# Patient Record
Sex: FEMALE | Employment: OTHER | URBAN - METROPOLITAN AREA
[De-identification: names, ages, dates, MRNs, and addresses within clinical notes are randomized per-mention and may not be internally consistent; named-entity substitution may affect disease eponyms.]

---

## 2018-01-03 ENCOUNTER — MEDICAL CORRESPONDENCE (OUTPATIENT)
Dept: HEALTH INFORMATION MANAGEMENT | Facility: CLINIC | Age: 58
End: 2018-01-03

## 2018-01-22 ENCOUNTER — RADIANT APPOINTMENT (OUTPATIENT)
Dept: MAMMOGRAPHY | Facility: CLINIC | Age: 58
End: 2018-01-22
Attending: INTERNAL MEDICINE
Payer: COMMERCIAL

## 2018-01-22 DIAGNOSIS — Z12.31 VISIT FOR SCREENING MAMMOGRAM: ICD-10-CM

## 2019-01-25 ENCOUNTER — MEDICAL CORRESPONDENCE (OUTPATIENT)
Dept: HEALTH INFORMATION MANAGEMENT | Facility: CLINIC | Age: 59
End: 2019-01-25

## 2020-01-30 ENCOUNTER — TRANSCRIBE ORDERS (OUTPATIENT)
Dept: OTHER | Age: 60
End: 2020-01-30

## 2020-01-30 DIAGNOSIS — R07.0 THROAT PAIN: Primary | ICD-10-CM

## 2020-01-30 NOTE — TELEPHONE ENCOUNTER
FUTURE VISIT INFORMATION      FUTURE VISIT INFORMATION:    Date: 2/20/20    Time: 1:40 PM    Location: [facility]  REFERRAL INFORMATION:    Referring provider:  Dr. Elli Acuña    Referring providers clinic:  [facility]    Reason for visit/diagnosis: Throat Pain    RECORDS REQUESTED FROM:       Clinic name Comments Records Status Imaging Status   [facility] 1/29/20 - OV and Referral from Dr. Acuña Care Everywhere    [facility] -  OV with Dennys Zhu [facility]BS  7/20/16 -  OV with Dr. Khalil  3/26/16 -  OV with Dr. Pineda Care Everywhere                              * 1/30/20 11:38 AM Faxed req to [facility] for OV records - [place]er[place]  * 2/3/20 8:58 AM Received fax from [facility] stating provider has not signed encounter yet so it is not available to be sent, will request again closer to appt date - Guerita

## 2020-01-31 ENCOUNTER — DOCUMENTATION ONLY (OUTPATIENT)
Dept: CARE COORDINATION | Facility: CLINIC | Age: 60
End: 2020-01-31

## 2020-02-06 ENCOUNTER — ANCILLARY PROCEDURE (OUTPATIENT)
Dept: MAMMOGRAPHY | Facility: CLINIC | Age: 60
End: 2020-02-06
Attending: INTERNAL MEDICINE
Payer: COMMERCIAL

## 2020-02-06 ENCOUNTER — ANCILLARY PROCEDURE (OUTPATIENT)
Dept: ULTRASOUND IMAGING | Facility: CLINIC | Age: 60
End: 2020-02-06
Attending: INTERNAL MEDICINE
Payer: COMMERCIAL

## 2020-02-06 DIAGNOSIS — Z12.31 VISIT FOR SCREENING MAMMOGRAM: ICD-10-CM

## 2020-02-06 DIAGNOSIS — B18.1 HEPATITIS B, CHRONIC (H): ICD-10-CM

## 2020-02-20 ENCOUNTER — OFFICE VISIT (OUTPATIENT)
Dept: OTOLARYNGOLOGY | Facility: CLINIC | Age: 60
End: 2020-02-20
Attending: INTERNAL MEDICINE
Payer: COMMERCIAL

## 2020-02-20 ENCOUNTER — PRE VISIT (OUTPATIENT)
Dept: OTOLARYNGOLOGY | Facility: CLINIC | Age: 60
End: 2020-02-20

## 2020-02-20 VITALS
WEIGHT: 110 LBS | BODY MASS INDEX: 22.18 KG/M2 | HEART RATE: 76 BPM | DIASTOLIC BLOOD PRESSURE: 67 MMHG | SYSTOLIC BLOOD PRESSURE: 107 MMHG | HEIGHT: 59 IN

## 2020-02-20 DIAGNOSIS — R09.A2 GLOBUS PHARYNGEUS: Primary | ICD-10-CM

## 2020-02-20 DIAGNOSIS — G51.4 FACIAL TWITCHING: ICD-10-CM

## 2020-02-20 RX ORDER — ARIPIPRAZOLE 5 MG/1
5 TABLET ORAL DAILY
COMMUNITY
Start: 2020-02-20

## 2020-02-20 ASSESSMENT — MIFFLIN-ST. JEOR: SCORE: 976.71

## 2020-02-20 NOTE — LETTER
2/20/2020       RE: Hon Kurtz  [address]  [facility] [address]     Dear Colleague,    Thank you for referring your patient, Hon Kurtz, to the [facility] EAR NOSE AND THROAT at [facility]. Please see a copy of my visit note below.    [facility] Ear, Nose and Throat Clinic New Patient Visit Note        Otolaryngology        February 20, 2020    Referring Provider:  Elli Acuña MD         HPI:  Hon Kurtz is a 60 year old female who presents for evaluation of 10 years of excessive phlegm in her throat.    With the help of , I interviewed .   reports that for greater than 10 years she has been getting phlegm in her throat and coughs it up.  She states she gets a sensation that something is stuck in her throat.  She points to the anterior portion of the neck, just above the thyroid gland when she reports this finding.  She states that when she gets that feeling, she tries to cough up phlegm.  This usually white in color.  It is fairly thin most of the time.  It seems to happen more often in the morning but it does occur throughout the day.  She notices it more with any kind of activity.  That activity can be just walking around the house and doing housecleaning, etc.  There is no pain in her throat associated with this.  No food gets stuck when she eats or drink.  She is not choking or coughing with eating or drinking.    She has not had any issues with fevers or chills, headaches, sore throat, nasal congestion, rhinorrhea, ear pain or drainage, hearing changes, tinnitus.  There is no associated change in taste or smell.  No masses in her neck.  No hemoptysis.  She just wants to know why she is been doing this.    ROS:  10 point review of systems completed and is negative except for those listed above    PMH: History of bipolar disorder, [place] Hunt syndrome with left-sided facial paresis since 2011.    PSH:   Past Surgical History:   Procedure Laterality  "Date     TUBAL LIGATION     Bilateral cataract surgery    FamH: No family history on file.    SocH:   Social History     Tobacco Use     Smoking status: Never Smoker     Smokeless tobacco: Never Used   Substance Use Topics     Alcohol use: None     Drug use: None       Medications:   Current Outpatient Medications   Medication     ARIPiprazole 5 MG PO tablet     IBUPROFEN PO     No current facility-administered medications for this visit.        Allergies:   No Known Allergies      Physical Exam:   /67   Pulse 76   Ht 1.51 m (4' 11.45\")   Wt 49.9 kg (110 lb)   BMI 21.88 kg/m       Constitutional: The patient was accompanied by , well-groomed, and in no acute distress.    Head: Normocephalic and atraumatic. Drooping of the left side of the face, involving the forehead, eye and mouth   Eyes: Pupils were equal and reactive.  Extraocular movement intact.  Drooping of the left eyelid   Ears: Pinnae and tragus non-tender.  Otologic microscopic ear exam:  Right: EAC clear, TM clear.  No evidence of retraction, effusion, perforation or cholesteatoma.  Left: EAC clear, TM clear.  No evidence of retraction, effusion, perforation or cholesteatoma.  Nose: Sinuses were non-tender.  Anterior rhinoscopy revealed midline septum and absence of purulence or polyps.    Mouth: Mucosa pink and moist, tonsils non-erythematous, no exudates, uvula midline, poor dentition, normal buccal mucosa, gingiva, tongue and palate, no masses or lesions  Neck: No lymphadenopathy in the neck.  No palpable thyroid.  Normal range of motion, no tenderness of the anterior neck  Respiratory: Breathing comfortably without stridor or exertion of accessory muscles. Patient makes and \"inspiratory stridor\" sound randomly during visit, but every time throat is palpated or stimulated, she makes this sound as well  Skin: Normal:  warm and pink without rash  Neurologic: Alert and oriented x 3. CN's III-XII within normal limits except for CNVII " with left sided facial dropping. Involuntary twitching of the left side of the face. Voice normal.   Psychiatric: The patient's affect was calm, cooperative, and appropriate.        Fiberoptic Endoscopy:  Consent for fiberoptic laryngoscopy was obtained, and we confirmed correctness of procedure and identity of patient.  Fiberoptic laryngoscopy was indicated due to globus.  The nose was topically decongested and anesthetized.  The fiberoptic laryngoscope was passed under endoscopic vision.  The turbinates were normal.  The inferior and middle meati were clear bilaterally without purulence, masses, or polyps.  The nasopharynx was clear.  The Eustachian tubes were clear.  The soft palate appeared normal with good mobility.  The epiglottis was sharp and the visualized portion of the vallecula was clear.  The larynx was clear with mobile cords.  The arytenoids were clear and there was no pooling in the hypopharynx.   No concerning lesions or masses.    See Imagestream recording in the [facility] Orders section        Assessment/Plan:     1. Globus pharyngeus  Patient with symptoms of globus.  No obvious masses on the endoscopy.  No palpable masses in the neck clinically.  No evidence of significant LPR or other reflux noted on exam.  No asymmetry of the vocal cord movements at this point.    Symptoms may be habit forming and voice therapy may be helpful in regards to treating this.  My other concern is as to whether or not this is related to her Abilify, which she is on for her bipolar disorder.  Also during our visit, she made random high-pitched inspiratory stridor type sounds.  This occurred at many random times but anytime the neck was palpated or tongue was examined.  Patient reports she has been doing this for many, many years, closer to the 10+ years that she has had the globus symptoms and spitting up with mucus.  I am concerned that these are more habit based things and possibly even related to anxiety or her  bipolar disorder.    Another concern I have,'s given the fact that she also has some left-sided facial twitching, is could these be related to the use of Abilify.  I have asked her to follow-up with her primary care provider or her psychiatrist in regards to her Abilify and the facial twitching and I hope they address this as well.    Because of the underlying globus and the potential habit of spitting and inspiratory pattern, I think she may benefit from some voice therapy to correct this or referred her to our laryngology/[place] voice clinic team.      2. Facial twitching  Patient with left-sided facial twitching which I am concerned is potentially related to her Abilify.  Asked the patient to follow-up with her primary care provider or psychiatrist, whoever is in charge of that medication at this time.  She agreed to do so.        Anne Ruiz PA-C  Otolaryngology  Head & Neck Surgery  [phone]      CC:  Elli Acuña MD

## 2020-02-20 NOTE — PROGRESS NOTES
[facility] Ear, Nose [facility] Throat [facility] New Patient Visit Note        Otolaryngology        February 20, 2020    Referring Provider:  Elli Acuña MD         HPI:  Hon Kurtz is a 60 year old female who presents for evaluation of 10 years of excessive phlegm in her throat.    With the help of , I interviewed .   reports that for greater than 10 years she has been getting phlegm in her throat and coughs it up.  She states she gets a sensation that something is stuck in her throat.  She points to the anterior portion of the neck, just above the thyroid gland when she reports this finding.  She states that when she gets that feeling, she tries to cough up phlegm.  This usually white in color.  It is fairly thin most of the time.  It seems to happen more often in the morning but it does occur throughout the day.  She notices it more with any kind of activity.  That activity can be just walking around the house and doing housecleaning, etc.  There is no pain in her throat associated with this.  No food gets stuck when she eats or drink.  She is not choking or coughing with eating or drinking.    She has not had any issues with fevers or chills, headaches, sore throat, nasal congestion, rhinorrhea, ear pain or drainage, hearing changes, tinnitus.  There is no associated change in taste or smell.  No masses in her neck.  No hemoptysis.  She just wants to know why she is been doing this.    ROS:  10 point review of systems completed and is negative except for those listed above    PMH: History of bipolar disorder, [place] Hunt syndrome with left-sided facial paresis since 2011.    PSH:   Past Surgical History:   Procedure Laterality Date     TUBAL LIGATION     Bilateral cataract surgery    FamH: No family history on file.    SocH:   Social History     Tobacco Use     Smoking status: Never Smoker     Smokeless tobacco: Never Used   Substance Use Topics     Alcohol use: None     Drug use: None       Medications:  "  Current Outpatient Medications   Medication     ARIPiprazole 5 MG PO tablet     IBUPROFEN PO     No current facility-administered medications for this visit.        Allergies:   No Known Allergies      Physical Exam:   /67   Pulse 76   Ht 1.51 m (4' 11.45\")   Wt 49.9 kg (110 lb)   BMI 21.88 kg/m      Constitutional: The patient was accompanied by , well-groomed, and in no acute distress.    Head: Normocephalic and atraumatic. Drooping of the left side of the face, involving the forehead, eye and mouth   Eyes: Pupils were equal and reactive.  Extraocular movement intact.  Drooping of the left eyelid   Ears: Pinnae and tragus non-tender.  Otologic microscopic ear exam:  Right: EAC clear, TM clear.  No evidence of retraction, effusion, perforation or cholesteatoma.  Left: EAC clear, TM clear.  No evidence of retraction, effusion, perforation or cholesteatoma.  Nose: Sinuses were non-tender.  Anterior rhinoscopy revealed midline septum and absence of purulence or polyps.    Mouth: Mucosa pink and moist, tonsils non-erythematous, no exudates, uvula midline, poor dentition, normal buccal mucosa, gingiva, tongue and palate, no masses or lesions  Neck: No lymphadenopathy in the neck.  No palpable thyroid.  Normal range of motion, no tenderness of the anterior neck  Respiratory: Breathing comfortably without stridor or exertion of accessory muscles. Patient makes and \"inspiratory stridor\" sound randomly during visit, but every time throat is palpated or stimulated, she makes this sound as well  Skin: Normal:  warm and pink without rash  Neurologic: Alert and oriented x 3. CN's III-XII within normal limits except for CNVII with left sided facial dropping. Involuntary twitching of the left side of the face. Voice normal.   Psychiatric: The patient's affect was calm, cooperative, and appropriate.        Fiberoptic Endoscopy:  Consent for fiberoptic laryngoscopy was obtained, and we confirmed correctness " of procedure and identity of patient.  Fiberoptic laryngoscopy was indicated due to globus.  The nose was topically decongested and anesthetized.  The fiberoptic laryngoscope was passed under endoscopic vision.  The turbinates were normal.  The inferior and middle meati were clear bilaterally without purulence, masses, or polyps.  The nasopharynx was clear.  The Eustachian tubes were clear.  The soft palate appeared normal with good mobility.  The epiglottis was sharp and the visualized portion of the vallecula was clear.  The larynx was clear with mobile cords.  The arytenoids were clear and there was no pooling in the hypopharynx.   No concerning lesions or masses.    See Imagestream recording in the [facility] Orders section        Assessment/Plan:     1. Globus pharyngeus  Patient with symptoms of globus.  No obvious masses on the endoscopy.  No palpable masses in the neck clinically.  No evidence of significant LPR or other reflux noted on exam.  No asymmetry of the vocal cord movements at this point.    Symptoms may be habit forming and voice therapy may be helpful in regards to treating this.  My other concern is as to whether or not this is related to her Abilify, which she is on for her bipolar disorder.  Also during our visit, she made random high-pitched inspiratory stridor type sounds.  This occurred at many random times but anytime the neck was palpated or tongue was examined.  Patient reports she has been doing this for many, many years, closer to the 10+ years that she has had the globus symptoms and spitting up with mucus.  I am concerned that these are more habit based things and possibly even related to anxiety or her bipolar disorder.    Another concern I have,'s given the fact that she also has some left-sided facial twitching, is could these be related to the use of Abilify.  I have asked her to follow-up with her primary care provider or her psychiatrist in regards to her Abilify and the facial  twitching and I hope they address this as well.    Because of the underlying globus and the potential habit of spitting and inspiratory pattern, I think she may benefit from some voice therapy to correct this or referred her to our laryngology/[place] voice clinic team.      2. Facial twitching  Patient with left-sided facial twitching which I am concerned is potentially related to her Abilify.  Asked the patient to follow-up with her primary care provider or psychiatrist, whoever is in charge of that medication at this time.  She agreed to do so.        Anne Ruiz PA-C  Otolaryngology  Head & Neck Surgery  [phone]      CC:  Elli Acuña MD   [facility] Hospitalist

## 2020-02-20 NOTE — PATIENT INSTRUCTIONS
Hon Kurtz,    It was a pleasure to see you today.    1. You were seen in the ENT Clinic today by Anne Ruiz PA-C.  If you have any questions or concerns after your appointment, please call   - Option 1: ENT Clinic: [phone]      2. Please return to see one of our Laryngologists for the globus symptoms.    3. Please talk to your primary care provider about the left facial twitching and my concern that this is possibly from the Abilify      Thank you,  Anne Ruiz PA-C  Otolaryngology  Head & Neck Surgery  [phone]

## 2022-02-24 ENCOUNTER — LAB REQUISITION (OUTPATIENT)
Dept: LAB | Facility: CLINIC | Age: 62
End: 2022-02-24
Payer: COMMERCIAL

## 2022-02-24 DIAGNOSIS — B18.1 CHRONIC VIRAL HEPATITIS B WITHOUT DELTA-AGENT (H): ICD-10-CM

## 2022-02-24 DIAGNOSIS — Z01.419 ENCOUNTER FOR GYNECOLOGICAL EXAMINATION (GENERAL) (ROUTINE) WITHOUT ABNORMAL FINDINGS: ICD-10-CM

## 2022-02-24 LAB
ALBUMIN SERPL-MCNC: 4 G/DL (ref 3.4–5)
ALP SERPL-CCNC: 145 U/L (ref 40–150)
ALT SERPL W P-5'-P-CCNC: 24 U/L (ref 0–50)
ANION GAP SERPL CALCULATED.3IONS-SCNC: 6 MMOL/L (ref 3–14)
AST SERPL W P-5'-P-CCNC: 18 U/L (ref 0–45)
BILIRUB SERPL-MCNC: 0.4 MG/DL (ref 0.2–1.3)
BUN SERPL-MCNC: 11 MG/DL (ref 7–30)
CALCIUM SERPL-MCNC: 9 MG/DL (ref 8.5–10.1)
CHLORIDE BLD-SCNC: 109 MMOL/L (ref 94–109)
CHOLEST SERPL-MCNC: 209 MG/DL
CO2 SERPL-SCNC: 27 MMOL/L (ref 20–32)
CREAT SERPL-MCNC: 0.57 MG/DL (ref 0.52–1.04)
FASTING STATUS PATIENT QL REPORTED: YES
GFR SERPL CREATININE-BSD FRML MDRD: >90 ML/MIN/1.73M2
GLUCOSE BLD-MCNC: 85 MG/DL (ref 70–99)
HDLC SERPL-MCNC: 89 MG/DL
LDLC SERPL CALC-MCNC: 108 MG/DL
NONHDLC SERPL-MCNC: 120 MG/DL
POTASSIUM BLD-SCNC: 3.8 MMOL/L (ref 3.4–5.3)
PROT SERPL-MCNC: 7.7 G/DL (ref 6.8–8.8)
SODIUM SERPL-SCNC: 142 MMOL/L (ref 133–144)
TRIGL SERPL-MCNC: 61 MG/DL

## 2022-02-24 PROCEDURE — 80061 LIPID PANEL: CPT | Mod: ORL | Performed by: PEDIATRICS

## 2022-02-24 PROCEDURE — 87517 HEPATITIS B DNA QUANT: CPT | Mod: ORL | Performed by: PEDIATRICS

## 2022-02-24 PROCEDURE — 80053 COMPREHEN METABOLIC PANEL: CPT | Mod: ORL | Performed by: PEDIATRICS

## 2022-02-25 LAB
HBV DNA SERPL NAA+PROBE-ACNC: <20 IU/ML
HBV DNA SERPL NAA+PROBE-LOG IU: <1.3 {LOG_IU}/ML

## 2022-02-28 ENCOUNTER — LAB REQUISITION (OUTPATIENT)
Dept: LAB | Facility: CLINIC | Age: 62
End: 2022-02-28
Payer: COMMERCIAL

## 2022-02-28 DIAGNOSIS — Z01.419 ENCOUNTER FOR GYNECOLOGICAL EXAMINATION (GENERAL) (ROUTINE) WITHOUT ABNORMAL FINDINGS: ICD-10-CM

## 2022-02-28 LAB — HEMOCCULT STL QL IA: NEGATIVE

## 2022-02-28 PROCEDURE — 82274 ASSAY TEST FOR BLOOD FECAL: CPT | Mod: ORL | Performed by: PEDIATRICS

## 2023-02-27 ENCOUNTER — LAB REQUISITION (OUTPATIENT)
Dept: LAB | Facility: CLINIC | Age: 63
End: 2023-02-27
Payer: COMMERCIAL

## 2023-02-27 DIAGNOSIS — B18.1 CHRONIC VIRAL HEPATITIS B WITHOUT DELTA-AGENT (H): ICD-10-CM

## 2023-02-27 DIAGNOSIS — Z13.220 ENCOUNTER FOR SCREENING FOR LIPOID DISORDERS: ICD-10-CM

## 2023-02-27 LAB
ALBUMIN SERPL BCG-MCNC: 4.3 G/DL (ref 3.5–5.2)
ALP SERPL-CCNC: 121 U/L (ref 35–104)
ALT SERPL W P-5'-P-CCNC: 15 U/L (ref 10–35)
ANION GAP SERPL CALCULATED.3IONS-SCNC: 15 MMOL/L (ref 7–15)
AST SERPL W P-5'-P-CCNC: 23 U/L (ref 10–35)
BILIRUB SERPL-MCNC: 0.6 MG/DL
BUN SERPL-MCNC: 18.3 MG/DL (ref 8–23)
CALCIUM SERPL-MCNC: 9.3 MG/DL (ref 8.8–10.2)
CHLORIDE SERPL-SCNC: 106 MMOL/L (ref 98–107)
CHOLEST SERPL-MCNC: 214 MG/DL
CREAT SERPL-MCNC: 0.55 MG/DL (ref 0.51–0.95)
DEPRECATED HCO3 PLAS-SCNC: 21 MMOL/L (ref 22–29)
GFR SERPL CREATININE-BSD FRML MDRD: >90 ML/MIN/1.73M2
GLUCOSE SERPL-MCNC: 103 MG/DL (ref 70–99)
HDLC SERPL-MCNC: 92 MG/DL
LDLC SERPL CALC-MCNC: 113 MG/DL
NONHDLC SERPL-MCNC: 122 MG/DL
POTASSIUM SERPL-SCNC: 3.8 MMOL/L (ref 3.4–5.3)
PROT SERPL-MCNC: 7.7 G/DL (ref 6.4–8.3)
SODIUM SERPL-SCNC: 142 MMOL/L (ref 136–145)
TRIGL SERPL-MCNC: 46 MG/DL

## 2023-02-27 PROCEDURE — 80053 COMPREHEN METABOLIC PANEL: CPT | Mod: ORL | Performed by: INTERNAL MEDICINE

## 2023-02-27 PROCEDURE — 80061 LIPID PANEL: CPT | Mod: ORL | Performed by: INTERNAL MEDICINE

## 2023-02-27 PROCEDURE — 87517 HEPATITIS B DNA QUANT: CPT | Mod: ORL | Performed by: INTERNAL MEDICINE

## 2023-02-28 LAB — HBV DNA SERPL NAA+PROBE-ACNC: NOT DETECTED IU/ML

## 2023-03-13 ENCOUNTER — LAB REQUISITION (OUTPATIENT)
Dept: LAB | Facility: CLINIC | Age: 63
End: 2023-03-13
Payer: COMMERCIAL

## 2023-03-13 DIAGNOSIS — Z12.11 ENCOUNTER FOR SCREENING FOR MALIGNANT NEOPLASM OF COLON: ICD-10-CM

## 2023-03-13 LAB — HEMOCCULT STL QL IA: NEGATIVE

## 2023-03-13 PROCEDURE — 82274 ASSAY TEST FOR BLOOD FECAL: CPT | Mod: ORL | Performed by: INTERNAL MEDICINE

## 2023-03-27 ENCOUNTER — LAB REQUISITION (OUTPATIENT)
Dept: LAB | Facility: CLINIC | Age: 63
End: 2023-03-27
Payer: COMMERCIAL

## 2023-03-27 DIAGNOSIS — Z12.4 ENCOUNTER FOR SCREENING FOR MALIGNANT NEOPLASM OF CERVIX: ICD-10-CM

## 2023-03-27 PROCEDURE — 87624 HPV HI-RISK TYP POOLED RSLT: CPT | Mod: ORL | Performed by: INTERNAL MEDICINE

## 2023-03-27 PROCEDURE — G0145 SCR C/V CYTO,THINLAYER,RESCR: HCPCS | Mod: ORL | Performed by: INTERNAL MEDICINE

## 2023-03-30 LAB
BKR LAB AP GYN ADEQUACY: NORMAL
BKR LAB AP GYN INTERPRETATION: NORMAL
BKR LAB AP HPV REFLEX: NORMAL
BKR LAB AP PREVIOUS ABNORMAL: NORMAL
PATH REPORT.COMMENTS IMP SPEC: NORMAL
PATH REPORT.COMMENTS IMP SPEC: NORMAL
PATH REPORT.RELEVANT HX SPEC: NORMAL

## 2023-04-03 LAB
HUMAN PAPILLOMA VIRUS 16 DNA: NEGATIVE
HUMAN PAPILLOMA VIRUS 18 DNA: NEGATIVE
HUMAN PAPILLOMA VIRUS FINAL DIAGNOSIS: NORMAL
HUMAN PAPILLOMA VIRUS OTHER HR: NEGATIVE

## 2023-04-24 ENCOUNTER — ANCILLARY PROCEDURE (OUTPATIENT)
Dept: ULTRASOUND IMAGING | Facility: CLINIC | Age: 63
End: 2023-04-24
Attending: INTERNAL MEDICINE
Payer: COMMERCIAL

## 2023-04-24 ENCOUNTER — ANCILLARY PROCEDURE (OUTPATIENT)
Dept: MRI IMAGING | Facility: CLINIC | Age: 63
End: 2023-04-24
Attending: INTERNAL MEDICINE
Payer: COMMERCIAL

## 2023-04-24 DIAGNOSIS — R51.9 HEAD ACHE: ICD-10-CM

## 2023-04-24 DIAGNOSIS — B18.1 CHRONIC VIRAL HEPATITIS B WITHOUT DELTA AGENT AND WITHOUT COMA (H): ICD-10-CM

## 2023-04-24 PROCEDURE — 76700 US EXAM ABDOM COMPLETE: CPT | Performed by: RADIOLOGY

## 2023-04-24 PROCEDURE — 70551 MRI BRAIN STEM W/O DYE: CPT | Mod: GC | Performed by: STUDENT IN AN ORGANIZED HEALTH CARE EDUCATION/TRAINING PROGRAM

## 2023-04-26 ENCOUNTER — MEDICAL CORRESPONDENCE (OUTPATIENT)
Dept: HEALTH INFORMATION MANAGEMENT | Facility: CLINIC | Age: 63
End: 2023-04-26
Payer: COMMERCIAL

## 2023-04-28 ENCOUNTER — TELEPHONE (OUTPATIENT)
Dept: OTOLARYNGOLOGY | Facility: CLINIC | Age: 63
End: 2023-04-28
Payer: COMMERCIAL

## 2023-04-28 ENCOUNTER — TRANSCRIBE ORDERS (OUTPATIENT)
Dept: OTHER | Age: 63
End: 2023-04-28

## 2023-04-28 DIAGNOSIS — J34.89 SPHENOID MASS: Primary | ICD-10-CM

## 2023-04-28 NOTE — TELEPHONE ENCOUNTER
[facility] Call Center    Phone Message    May a detailed message be left on voicemail: yes     Reason for Call: Appointment Intake    Referring Provider Name: Dr. Ronaldo Hough at [facility]  Diagnosis and/or Symptoms: Brain MRI shows sphenoid mass, debris or fungus ball. ENT eval for possible direct visualization    Please review - dx not in protocols    Action Taken: Message routed to:  [facility] ENT    Travel Screening: Not Applicable

## 2023-05-02 ENCOUNTER — TELEPHONE (OUTPATIENT)
Dept: OTOLARYNGOLOGY | Facility: CLINIC | Age: 63
End: 2023-05-02
Payer: COMMERCIAL

## 2023-05-04 NOTE — TELEPHONE ENCOUNTER
Patient called by 3 different clinic coordinators as well as this writer in the since 5/2/23. Unable to make contact with patient to schedule urgent referral for sphenoid mass.    DENISE CLAROS LPN on 5/4/2023 at 1:01 PM

## 2023-05-05 ENCOUNTER — TELEPHONE (OUTPATIENT)
Dept: OTOLARYNGOLOGY | Facility: CLINIC | Age: 63
End: 2023-05-05
Payer: COMMERCIAL

## 2023-05-05 NOTE — TELEPHONE ENCOUNTER
Unable to LVM on any listed number for patient or spouse.     SCHEDULING INSTRUCTIONS: If patient calls in, please schedule for first available NEW RHINOLOGY P1 slot and send telephone encounter to ENT clinic pool asking if earlier appointment is necessary.

## 2023-05-05 NOTE — TELEPHONE ENCOUNTER
[facility] Call Center    Phone Message    May a detailed message be left on voicemail: yes     Reason for Call: Other: Pt called back to schedule and confirmed phone # of [phone]. Pt states this is the phone number they are calling from. Please reach back out to pt to schedule for sphenoid mass. Thank you.      Action Taken: Message routed to:  [facility] ENT    Travel Screening: Not Applicable

## 2023-05-08 NOTE — TELEPHONE ENCOUNTER
FUTURE VISIT INFORMATION      FUTURE VISIT INFORMATION:    Date: 5/12/23    Time: 3PM    Location: [facility]  REFERRAL INFORMATION:    Referring provider:  Dr. Ronaldo Hough    Referring providers clinic:  [facility]    Reason for visit/diagnosis  Dx: Spheniod mass , sched per p[place] group [facility] [place] [facility] [place]    RECORDS REQUESTED FROM:       Clinic name Comments Records Status Imaging Status   [facility]   4/26/23, 3/27/23- REFERRAL AND NOTE FROM Dr. Ronaldo Hough SCANNED IN Epic & CE    IMAGING  4/24/23- MR BRAIN   4/24/23- US ABDOMEN  [facility]  PACS

## 2023-05-08 NOTE — TELEPHONE ENCOUNTER
[facility] Call Center    Phone Message    May a detailed message be left on voicemail: yes     Reason for Call: Other: Pt is currently scheduled for 5/12 , please advise if pt should be seen sooner . Thank you     Action Taken: Message routed to:  [facility] ENT     Travel Screening: Not Applicable

## 2023-05-12 ENCOUNTER — PRE VISIT (OUTPATIENT)
Dept: OTOLARYNGOLOGY | Facility: CLINIC | Age: 63
End: 2023-05-12

## 2023-05-12 ENCOUNTER — ANCILLARY PROCEDURE (OUTPATIENT)
Dept: CT IMAGING | Facility: CLINIC | Age: 63
End: 2023-05-12
Attending: OTOLARYNGOLOGY
Payer: COMMERCIAL

## 2023-05-12 ENCOUNTER — OFFICE VISIT (OUTPATIENT)
Dept: OTOLARYNGOLOGY | Facility: CLINIC | Age: 63
End: 2023-05-12
Attending: INTERNAL MEDICINE
Payer: COMMERCIAL

## 2023-05-12 VITALS
HEIGHT: 59 IN | BODY MASS INDEX: 23.02 KG/M2 | DIASTOLIC BLOOD PRESSURE: 71 MMHG | SYSTOLIC BLOOD PRESSURE: 118 MMHG | HEART RATE: 78 BPM | WEIGHT: 114.2 LBS | TEMPERATURE: 97 F | OXYGEN SATURATION: 99 %

## 2023-05-12 DIAGNOSIS — G50.1 ATYPICAL FACIAL PAIN: ICD-10-CM

## 2023-05-12 DIAGNOSIS — R09.81 NASAL CONGESTION: ICD-10-CM

## 2023-05-12 DIAGNOSIS — J34.89 SPHENOID MASS: ICD-10-CM

## 2023-05-12 DIAGNOSIS — J34.1 PARANASAL SINUS MUCOCELE: ICD-10-CM

## 2023-05-12 DIAGNOSIS — J34.89 SPHENOID MASS: Primary | ICD-10-CM

## 2023-05-12 PROCEDURE — 31231 NASAL ENDOSCOPY DX: CPT | Performed by: OTOLARYNGOLOGY

## 2023-05-12 PROCEDURE — 70486 CT MAXILLOFACIAL W/O DYE: CPT | Performed by: STUDENT IN AN ORGANIZED HEALTH CARE EDUCATION/TRAINING PROGRAM

## 2023-05-12 PROCEDURE — 99204 OFFICE O/P NEW MOD 45 MIN: CPT | Mod: 25 | Performed by: OTOLARYNGOLOGY

## 2023-05-12 ASSESSMENT — PAIN SCALES - GENERAL: PAINLEVEL: NO PAIN (0)

## 2023-05-12 NOTE — NURSING NOTE
"Chief Complaint   Patient presents with     Consult    Blood pressure 118/71, pulse 78, temperature 97  F (36.1  C), temperature source Temporal, height 1.499 m (4' 11\"), weight 51.8 kg (114 lb 3.2 oz), SpO2 99 %. Latanya Samuels LPN    "

## 2023-05-12 NOTE — PROGRESS NOTES
"  [facility]  New Patient Visit      Encounter date:   May 12, 2023    Referring Provider:   Ronaldo Hough MD  [address] [place] [address]     Chief Complaint: Sphenoid infection    History of Present Illness:   History obtained through Cuban video interpretor and patient's .   Hon Nano Kurtz is a 63 year old female who presents for consultation regarding sphenoid infection. She underwent MRI on 4/24/23 for workup of headache and was found to have left sphenoid mass vs infection. Her headache has since resolved.     She is with her  today.     Sino-Nasal Outcome Test (SNOT - 22)  [facility] Operative History:  No sinonasal surgery    Review of systems: A 14-point review of systems has been conducted and was negative for any notable symptoms, except as dictated in the history of present illness.     Medical History:  No past medical history on file.     Surgical History:   Past Surgical History:   Procedure Laterality Date     TUBAL LIGATION          Family History:  No family history on file.     Social History:   Social History     Socioeconomic History     Marital status:    Tobacco Use     Smoking status: Never     Smokeless tobacco: Never        Physical Exam:  Vital signs: /71 (BP Location: Right arm, Patient Position: Sitting, Cuff Size: Adult Small)   Pulse 78   Temp 97  F (36.1  C) (Temporal)   Ht 1.499 m (4' 11\")   Wt 51.8 kg (114 lb 3.2 oz)   SpO2 99%   BMI 23.07 kg/m     General Appearance: No acute distress, appropriate demeanor, conversant  Eyes: moist conjunctivae; EOMI; pupils symmetric; visual acuity grossly intact; no proptosis  Head: normocephalic; overall symmetric appearance without deformity  Face: Synkinesis of left  facial movements.   Ears: Normal appearance of external ear; external meatus normal in appearance; TMs intact without perforation bilaterally;   Nose: No external deformity; see endoscopy  Oral " Cavity/oropharynx: Normal appearance of mucosa; tongue midline; no mass or lesions; oropharynx without obvious mucosal abnormality  Neck: no palpable lymphadenopathy; thyroid without palpable nodules  Lungs: symmetric chest rise; no wheezing  CV: Good distal perfusion; normal hear rate  Extremities: No deformity  Neurologic Exam: Cranial nerves II-XII are grossly intact; no focal deficit    Procedure Note  Procedure performed: Rigid nasal endoscopy  Indication: To evaluate for sinonasal pathology not visualized on routine anterior rhinoscopy  Anesthesia: 4% topical lidocaine with 0.05% oxymetazoline  Description of procedure: A 30 degree, 3 mm rigid endoscope was inserted into bilateral nasal cavities and the nasal valves, nasal cavity, middle meatus, sphenoethmoid recess, and nasopharynx were thoroughly evaluated for evidence of obstruction, edema, purulence, polyps and/or mass/lesion.     Jone-Antony Endoscopic Scoring System  Endoscopic observation Right Left   Polyps in middle meatus (0 = absent, 1 = restricted to middle meatus, 2 = Beyond middle meatus)  0   Discharge (0 = absent, 1 = thin and clear, 2 = thick, purulent)  2   Edema (0 = absent, 1 = mild-moderate, 2 = moderate-severe)  0   Crusting (0 = absent, 1 = mild-moderate, 2 = moderate-severe)  0   Scarring (0= absent, 1 = mild-moderate, 2 = moderate-severe)  0   Total  2     Findings  LT: drainage from the SER    The patient tolerated the procedure well without complication.     Laboratory Review:  n/a    Imaging Review:  MR B 4/24/23     1. T1 hyperintense and diffusion restricting focus in the expanded  left sphenoid locule is indeterminant, this could represent mass,  inspissated debris or fungus ball. Recommend ENT referral and  considered direct visualization.  2. Susceptibility artifact in the right thalamus and cerebellar  hemisphere most suggestive of prior microhemorrhages.  3. Minimal degree of presumable leukoaraiosis.     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 I discussed surgery with the patient and her  and we will coordinate scheduling surgery with them. We will get an updated CT scan of patient's sinuses and request PAC 30 days prior to surgery.     I discussed the risks, benefits, and alternatives to endoscopic sinonasal surgery, including but not limited to: pain, bleeding, infection, CSF leak, orbital injury resulting in vision changes and/or vision loss, septal perforation and/or hematoma, dental hypesthesia, facial numbness, need for continued medical management after surgery, and need for additional procedures, among others. She and her  were allowed to ask questions, which I answered to the best of my ability. After this discussion, surgery was elected.       Cuco Jade MD    [facility]  Rhinology  Endoscopic Skull Base Surgery  [facility]  Department of Otolaryngology - Head & Neck Surgery    Scribe Disclosure:  I, Reddy Sim, am serving as a scribe to document services personally performed by Cuco Jade MD at this visit, based upon the provider's statements to me. All documentation has been reviewed by the aforementioned provider prior to being entered into the official medical record.

## 2023-05-12 NOTE — LETTER
"5/12/2023       RE: Hon Kurtz  [address]  [facility] [address]     Dear Colleague,    Thank you for referring your patient, Hon Kurtz, to the [facility] [place] [facility]. Please see a copy of my visit note below.      [facility]  New Patient Visit      Encounter date:   May 12, 2023    Referring Provider:   Ronaldo Hough MD  [address] [place] [address]     Chief Complaint: Sphenoid infection    History of Present Illness:   History obtained through [employer] interpretor and patient's .   Hon Nano Kurtz is a 63 year old female who presents for consultation regarding sphenoid infection. She underwent MRI on 4/24/23 for workup of headache and was found to have left sphenoid mass vs infection. Her headache has since resolved.     She is with her  today.     Sino-Nasal Outcome Test (SNOT - 22)  [facility] Operative History:  No sinonasal surgery    Review of systems: A 14-point review of systems has been conducted and was negative for any notable symptoms, except as dictated in the history of present illness.     Medical History:  No past medical history on file.     Surgical History:   Past Surgical History:   Procedure Laterality Date    TUBAL LIGATION          Family History:  No family history on file.     Social History:   Social History     Socioeconomic History    Marital status:    Tobacco Use    Smoking status: Never    Smokeless tobacco: Never        Physical Exam:  Vital signs: /71 (BP Location: Right arm, Patient Position: Sitting, Cuff Size: Adult Small)   Pulse 78   Temp 97  F (36.1  C) (Temporal)   Ht 1.499 m (4' 11\")   Wt 51.8 kg (114 lb 3.2 oz)   SpO2 99%   BMI 23.07 kg/m     General Appearance: No acute distress, appropriate demeanor, conversant  Eyes: moist conjunctivae; EOMI; pupils symmetric; visual acuity grossly intact; no " proptosis  Head: normocephalic; overall symmetric appearance without deformity  Face: Synkinesis of left  facial movements.   Ears: Normal appearance of external ear; external meatus normal in appearance; TMs intact without perforation bilaterally;   Nose: No external deformity; see endoscopy  Oral Cavity/oropharynx: Normal appearance of mucosa; tongue midline; no mass or lesions; oropharynx without obvious mucosal abnormality  Neck: no palpable lymphadenopathy; thyroid without palpable nodules  Lungs: symmetric chest rise; no wheezing  CV: Good distal perfusion; normal hear rate  Extremities: No deformity  Neurologic Exam: Cranial nerves II-XII are grossly intact; no focal deficit    Procedure Note  Procedure performed: Rigid nasal endoscopy  Indication: To evaluate for sinonasal pathology not visualized on routine anterior rhinoscopy  Anesthesia: 4% topical lidocaine with 0.05% oxymetazoline  Description of procedure: A 30 degree, 3 mm rigid endoscope was inserted into bilateral nasal cavities and the nasal valves, nasal cavity, middle meatus, sphenoethmoid recess, and nasopharynx were thoroughly evaluated for evidence of obstruction, edema, purulence, polyps and/or mass/lesion.     [place] Endoscopic Scoring System  Endoscopic observation Right Left   Polyps in middle meatus (0 = absent, 1 = restricted to middle meatus, 2 = Beyond middle meatus)  0   Discharge (0 = absent, 1 = thin and clear, 2 = thick, purulent)  2   Edema (0 = absent, 1 = mild-moderate, 2 = moderate-severe)  0   Crusting (0 = absent, 1 = mild-moderate, 2 = moderate-severe)  0   Scarring (0= absent, 1 = mild-moderate, 2 = moderate-severe)  0   Total  2     Findings  LT: drainage from the SER    The patient tolerated the procedure well without complication.     Laboratory Review:  n/a    Imaging Review:  MR B 4/24/23     1. T1 hyperintense and diffusion restricting focus in the expanded  left sphenoid locule is indeterminant, this could  represent mass,  inspissated debris or fungus ball. Recommend ENT referral and  considered direct visualization.  2. Susceptibility artifact in the right thalamus and cerebellar  hemisphere most suggestive of prior microhemorrhages.  3. Minimal degree of presumable leukoaraiosis.     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 I discussed surgery with the patient and her  and we will coordinate scheduling surgery with them. We will get an updated CT scan of patient's sinuses and request PAC 30 days prior to surgery.     I discussed the risks, benefits, and alternatives to endoscopic sinonasal surgery, including but not limited to: pain, bleeding, infection, CSF leak, orbital injury resulting in vision changes and/or vision loss, septal perforation and/or hematoma, dental hypesthesia, facial numbness, need for continued medical management after surgery, and need for additional procedures, among others. She and her  were allowed to ask questions, which I answered to the best of my ability. After this discussion, surgery was elected.       Cuco Jade MD    [facility]  Rhinology  Endoscopic Skull Base Surgery  [facility]  Department of Otolaryngology - Head & Neck Surgery    Scribe Disclosure:  I, Reddy Sim, am serving as a scribe to document services personally performed by Cuco Jade MD at this visit, based upon the provider's statements to me. All documentation has been reviewed by the aforementioned provider prior to being  entered into the official medical record.        Again, thank you for allowing me to participate in the care of your patient.      Sincerely,    Cuco Jade MD

## 2023-06-08 ENCOUNTER — TELEPHONE (OUTPATIENT)
Dept: OTOLARYNGOLOGY | Facility: CLINIC | Age: 63
End: 2023-06-08
Payer: COMMERCIAL

## 2023-06-08 ENCOUNTER — APPOINTMENT (OUTPATIENT)
Dept: INTERPRETER SERVICES | Facility: CLINIC | Age: 63
End: 2023-06-08
Payer: COMMERCIAL

## 2023-06-08 PROBLEM — J34.89 SPHENOID MASS: Status: ACTIVE | Noted: 2023-06-08

## 2023-06-08 NOTE — TELEPHONE ENCOUNTER
Called patients  via  to schedule surgery with Dr. Jade    Date of Surgery: 6/29    Location of surgery: [facility]    Pre-Op H&P: PCP on file - added new PCP as she just started seeing her    Pre/Post Imaging:  Yes CT already scheduled    Post-Op Appt Date: 1 week    Surgery Packet Mailed: yes - Hebrew     Additional comments: CORNELIA Harrison on 6/8/2023 at 11:42 AM

## 2023-06-19 ENCOUNTER — APPOINTMENT (OUTPATIENT)
Dept: INTERPRETER SERVICES | Facility: CLINIC | Age: 63
End: 2023-06-19
Payer: COMMERCIAL

## 2023-06-19 ENCOUNTER — TELEPHONE (OUTPATIENT)
Dept: OTOLARYNGOLOGY | Facility: CLINIC | Age: 63
End: 2023-06-19
Payer: COMMERCIAL

## 2023-06-19 NOTE — TELEPHONE ENCOUNTER
Surgery Teaching    1. Someone from our scheduling department will call you within approximately one week to get you scheduled with your provider for surgery. If no one has called you in one week, please notify us.    2. You must have a physical exam (called  history and physical ) within 30 days of surgery. You may complete this with your primary care provider.    In some cases we may have you see our Preoperative Assessment Center. If we have expressed this to you, our  will set up your appointment with them when they call to set up your surgery.    3. For same-day surgery, you must arrange for an adult to take you home from the Center. An adult must stay with you for the first 24 hours after surgery. You cannot drive for 24 hours.     4. Ask your doctor what medicines are safe before surgery. For over the counter medications and supplements it is advised that you do NOT TAKE MOTRIN, IBUPROFEN, ASPIRIN, ALEVE, GARLIC SUPPLEMENTS or FISH OIL x 7 days prior to surgery (to prevent excess bleeding and bruising at time of surgery). Tylenol is ok.    5. A few days prior to surgery a nurse will call you to review your health history and instructions for before and after surgery. They will give you your final arrival time based upon your scheduled arrival time for surgery.    6. Call the surgical team if there's any change in your health prior to surgery within 2 weeks of surgery. Fever, body aches, chills. Flu and covid like symptoms.    7. If you drink alcohol, stop drinking alcohol at least 24 hours before surgery.    8. If you smoke, stop or at least cut down on smoking 24 hours before surgery.    9.Take a bath or shower the night before and the morning of surgery (as told by your surgeon). Use an antiseptic soap. If your doctor does not give you special soap, buy Hibiclens or Tg-Stat at the drug store or ask the pharmacist to suggest a brand. You will wash with this from the neck down, washing your hair  and face as you would normally.   A. When you are done with your shower please be sure to use clean towels to dry with, have clean linens on your bed, and put on clean clothes each time.   B. DO NOT put on lotion, powder, perfume, deodorant or make-up after bathing.    10. You can eat a normal meal the night before surgery. Do not eat any solid foods or consume any dairy products 8 hours before surgery.     11. You may drink clear liquids up until 2 hours before surgery. Example: water, Gatorade, apple juice and liquids you can see through.    12. At the 2 hour point prior to surgery you should not be ingesting anything more. Your post op team will review any diet limitations you might have and when you can start eating and drinking again after surgery.    After surgery:    DO NOT blow your nose until you are seen back in clinic for your first post op appointment.     If you have to sneeze please do so with your mouth open.    You will feel very congested and find it hard to breath through your nose until your first post op appointment.       If you have any questions before or after surgery please call:    ASTON Fontaine  [facility]  Department of Otolaryngology  [phone]

## 2023-06-22 NOTE — TELEPHONE ENCOUNTER
Writer tried reaching the patient again with interpretor, both numbers did not have voicemail set up and would not answer.     I have not been able to give her surgery instructions. Surgery scheduled 6/29.    Will notify surgeon and surgery schedulers.     Shabnam Partida RN on 6/22/2023 at 12:20 PM

## 2023-06-23 ENCOUNTER — APPOINTMENT (OUTPATIENT)
Dept: INTERPRETER SERVICES | Facility: CLINIC | Age: 63
End: 2023-06-23
Payer: COMMERCIAL

## 2023-06-23 NOTE — TELEPHONE ENCOUNTER
Writer tried calling patients spouse with the interpretor line. We tried calling twice but the number was either restricted or they were busy.     I will try again later today.     Shabnam Partida RN on 6/23/2023 at 10:29 AM

## 2023-06-27 ENCOUNTER — APPOINTMENT (OUTPATIENT)
Dept: INTERPRETER SERVICES | Facility: CLINIC | Age: 63
End: 2023-06-27
Payer: COMMERCIAL

## 2023-06-28 ENCOUNTER — ANESTHESIA EVENT (OUTPATIENT)
Dept: SURGERY | Facility: AMBULATORY SURGERY CENTER | Age: 63
End: 2023-06-28
Payer: COMMERCIAL

## 2023-06-29 ENCOUNTER — ANESTHESIA (OUTPATIENT)
Dept: SURGERY | Facility: AMBULATORY SURGERY CENTER | Age: 63
End: 2023-06-29
Payer: COMMERCIAL

## 2023-06-29 ENCOUNTER — HOSPITAL ENCOUNTER (OUTPATIENT)
Facility: AMBULATORY SURGERY CENTER | Age: 63
Discharge: HOME | End: 2023-06-29
Attending: OTOLARYNGOLOGY
Payer: COMMERCIAL

## 2023-06-29 VITALS
BODY MASS INDEX: 21.99 KG/M2 | OXYGEN SATURATION: 96 % | TEMPERATURE: 97.2 F | WEIGHT: 112 LBS | RESPIRATION RATE: 14 BRPM | DIASTOLIC BLOOD PRESSURE: 80 MMHG | HEIGHT: 60 IN | HEART RATE: 68 BPM | SYSTOLIC BLOOD PRESSURE: 127 MMHG

## 2023-06-29 DIAGNOSIS — J34.89 SPHENOID MASS: ICD-10-CM

## 2023-06-29 PROCEDURE — 87107 FUNGI IDENTIFICATION MOLD: CPT | Performed by: OTOLARYNGOLOGY

## 2023-06-29 PROCEDURE — 31259 NSL/SINS NDSC SPHN TISS RMVL: CPT | Mod: 52 | Performed by: OTOLARYNGOLOGY

## 2023-06-29 PROCEDURE — 31259 NSL/SINS NDSC SPHN TISS RMVL: CPT | Mod: LT

## 2023-06-29 PROCEDURE — 99000 SPECIMEN HANDLING OFFICE-LAB: CPT | Performed by: PATHOLOGY

## 2023-06-29 PROCEDURE — 61782 SCAN PROC CRANIAL EXTRA: CPT | Mod: GC | Performed by: OTOLARYNGOLOGY

## 2023-06-29 PROCEDURE — 87102 FUNGUS ISOLATION CULTURE: CPT | Performed by: OTOLARYNGOLOGY

## 2023-06-29 PROCEDURE — 87075 CULTR BACTERIA EXCEPT BLOOD: CPT | Performed by: OTOLARYNGOLOGY

## 2023-06-29 DEVICE — PROPEL CONTOUR SINUS IMPLANT
Type: IMPLANTABLE DEVICE | Site: NOSE | Status: FUNCTIONAL
Brand: PROPEL CONTOUR

## 2023-06-29 RX ORDER — OXYMETAZOLINE HYDROCHLORIDE 0.05 G/100ML
SPRAY NASAL PRN
Status: DISCONTINUED | OUTPATIENT
Start: 2023-06-29 | End: 2023-06-29 | Stop reason: HOSPADM

## 2023-06-29 RX ORDER — LIDOCAINE HYDROCHLORIDE AND EPINEPHRINE 10; 10 MG/ML; UG/ML
INJECTION, SOLUTION INFILTRATION; PERINEURAL PRN
Status: DISCONTINUED | OUTPATIENT
Start: 2023-06-29 | End: 2023-06-29 | Stop reason: HOSPADM

## 2023-06-29 RX ORDER — LIDOCAINE 40 MG/G
CREAM TOPICAL
Status: DISCONTINUED | OUTPATIENT
Start: 2023-06-29 | End: 2023-06-30 | Stop reason: HOSPADM

## 2023-06-29 RX ORDER — ONDANSETRON 4 MG/1
4 TABLET, ORALLY DISINTEGRATING ORAL EVERY 30 MIN PRN
Status: DISCONTINUED | OUTPATIENT
Start: 2023-06-29 | End: 2023-06-30 | Stop reason: HOSPADM

## 2023-06-29 RX ORDER — HYDROMORPHONE HYDROCHLORIDE 1 MG/ML
0.2 INJECTION, SOLUTION INTRAMUSCULAR; INTRAVENOUS; SUBCUTANEOUS EVERY 5 MIN PRN
Status: DISCONTINUED | OUTPATIENT
Start: 2023-06-29 | End: 2023-06-30 | Stop reason: HOSPADM

## 2023-06-29 RX ORDER — FENTANYL CITRATE 50 UG/ML
25 INJECTION, SOLUTION INTRAMUSCULAR; INTRAVENOUS EVERY 5 MIN PRN
Status: DISCONTINUED | OUTPATIENT
Start: 2023-06-29 | End: 2023-06-30 | Stop reason: HOSPADM

## 2023-06-29 RX ORDER — PROPOFOL 10 MG/ML
INJECTION, EMULSION INTRAVENOUS PRN
Status: DISCONTINUED | OUTPATIENT
Start: 2023-06-29 | End: 2023-06-29

## 2023-06-29 RX ORDER — LIDOCAINE HYDROCHLORIDE 20 MG/ML
INJECTION, SOLUTION INFILTRATION; PERINEURAL PRN
Status: DISCONTINUED | OUTPATIENT
Start: 2023-06-29 | End: 2023-06-29

## 2023-06-29 RX ORDER — EPINEPHRINE 1 MG/ML
INJECTION, SOLUTION INTRAMUSCULAR; SUBCUTANEOUS PRN
Status: DISCONTINUED | OUTPATIENT
Start: 2023-06-29 | End: 2023-06-29 | Stop reason: HOSPADM

## 2023-06-29 RX ORDER — SODIUM CHLORIDE, SODIUM LACTATE, POTASSIUM CHLORIDE, CALCIUM CHLORIDE 600; 310; 30; 20 MG/100ML; MG/100ML; MG/100ML; MG/100ML
INJECTION, SOLUTION INTRAVENOUS CONTINUOUS
Status: DISCONTINUED | OUTPATIENT
Start: 2023-06-29 | End: 2023-06-30 | Stop reason: HOSPADM

## 2023-06-29 RX ORDER — HYDROMORPHONE HYDROCHLORIDE 1 MG/ML
0.4 INJECTION, SOLUTION INTRAMUSCULAR; INTRAVENOUS; SUBCUTANEOUS EVERY 5 MIN PRN
Status: DISCONTINUED | OUTPATIENT
Start: 2023-06-29 | End: 2023-06-30 | Stop reason: HOSPADM

## 2023-06-29 RX ORDER — FENTANYL CITRATE 50 UG/ML
INJECTION, SOLUTION INTRAMUSCULAR; INTRAVENOUS PRN
Status: DISCONTINUED | OUTPATIENT
Start: 2023-06-29 | End: 2023-06-29

## 2023-06-29 RX ORDER — ONDANSETRON 2 MG/ML
4 INJECTION INTRAMUSCULAR; INTRAVENOUS EVERY 30 MIN PRN
Status: DISCONTINUED | OUTPATIENT
Start: 2023-06-29 | End: 2023-06-30 | Stop reason: HOSPADM

## 2023-06-29 RX ORDER — CEFAZOLIN SODIUM 2 G/50ML
2 SOLUTION INTRAVENOUS
Status: COMPLETED | OUTPATIENT
Start: 2023-06-29 | End: 2023-06-29

## 2023-06-29 RX ORDER — PROPOFOL 10 MG/ML
INJECTION, EMULSION INTRAVENOUS CONTINUOUS PRN
Status: DISCONTINUED | OUTPATIENT
Start: 2023-06-29 | End: 2023-06-29

## 2023-06-29 RX ORDER — OXYCODONE HYDROCHLORIDE 5 MG/1
5 TABLET ORAL EVERY 6 HOURS PRN
Qty: 12 TABLET | Refills: 0 | Status: SHIPPED | OUTPATIENT
Start: 2023-06-29 | End: 2023-07-02

## 2023-06-29 RX ORDER — FENTANYL CITRATE 50 UG/ML
50 INJECTION, SOLUTION INTRAMUSCULAR; INTRAVENOUS EVERY 5 MIN PRN
Status: DISCONTINUED | OUTPATIENT
Start: 2023-06-29 | End: 2023-06-30 | Stop reason: HOSPADM

## 2023-06-29 RX ORDER — DEXAMETHASONE SODIUM PHOSPHATE 10 MG/ML
10 INJECTION, SOLUTION INTRAMUSCULAR; INTRAVENOUS ONCE
Status: DISCONTINUED | OUTPATIENT
Start: 2023-06-29 | End: 2023-06-30 | Stop reason: HOSPADM

## 2023-06-29 RX ORDER — ACETAMINOPHEN 325 MG/1
975 TABLET ORAL ONCE
Status: COMPLETED | OUTPATIENT
Start: 2023-06-29 | End: 2023-06-29

## 2023-06-29 RX ORDER — ECHINACEA PURPUREA EXTRACT 125 MG
TABLET ORAL
Qty: 480 ML | Refills: 0 | Status: SHIPPED | OUTPATIENT
Start: 2023-06-29

## 2023-06-29 RX ORDER — OXYCODONE HYDROCHLORIDE 5 MG/1
5 TABLET ORAL
Status: DISCONTINUED | OUTPATIENT
Start: 2023-06-29 | End: 2023-06-30 | Stop reason: HOSPADM

## 2023-06-29 RX ORDER — DEXAMETHASONE SODIUM PHOSPHATE 4 MG/ML
INJECTION, SOLUTION INTRA-ARTICULAR; INTRALESIONAL; INTRAMUSCULAR; INTRAVENOUS; SOFT TISSUE PRN
Status: DISCONTINUED | OUTPATIENT
Start: 2023-06-29 | End: 2023-06-29

## 2023-06-29 RX ORDER — CEFAZOLIN SODIUM 2 G/50ML
2 SOLUTION INTRAVENOUS SEE ADMIN INSTRUCTIONS
Status: DISCONTINUED | OUTPATIENT
Start: 2023-06-29 | End: 2023-06-30 | Stop reason: HOSPADM

## 2023-06-29 RX ORDER — OXYCODONE HYDROCHLORIDE 5 MG/1
10 TABLET ORAL
Status: DISCONTINUED | OUTPATIENT
Start: 2023-06-29 | End: 2023-06-30 | Stop reason: HOSPADM

## 2023-06-29 RX ORDER — ONDANSETRON 8 MG/1
8 TABLET, ORALLY DISINTEGRATING ORAL EVERY 8 HOURS PRN
Qty: 12 TABLET | Refills: 0 | Status: SHIPPED | OUTPATIENT
Start: 2023-06-29

## 2023-06-29 RX ADMIN — LIDOCAINE HYDROCHLORIDE 60 MG: 20 INJECTION, SOLUTION INFILTRATION; PERINEURAL at 08:09

## 2023-06-29 RX ADMIN — FENTANYL CITRATE 50 MCG: 50 INJECTION, SOLUTION INTRAMUSCULAR; INTRAVENOUS at 08:08

## 2023-06-29 RX ADMIN — LIDOCAINE HYDROCHLORIDE 60 MG: 20 INJECTION, SOLUTION INFILTRATION; PERINEURAL at 09:44

## 2023-06-29 RX ADMIN — PROPOFOL 30 MG: 10 INJECTION, EMULSION INTRAVENOUS at 08:42

## 2023-06-29 RX ADMIN — PROPOFOL 120 MG: 10 INJECTION, EMULSION INTRAVENOUS at 08:10

## 2023-06-29 RX ADMIN — CEFAZOLIN SODIUM 2 G: 2 SOLUTION INTRAVENOUS at 08:04

## 2023-06-29 RX ADMIN — PROPOFOL 150 MCG/KG/MIN: 10 INJECTION, EMULSION INTRAVENOUS at 08:07

## 2023-06-29 RX ADMIN — Medication 40 MG: at 08:10

## 2023-06-29 RX ADMIN — ACETAMINOPHEN 975 MG: 325 TABLET ORAL at 07:03

## 2023-06-29 RX ADMIN — DEXAMETHASONE SODIUM PHOSPHATE 10 MG: 4 INJECTION, SOLUTION INTRA-ARTICULAR; INTRALESIONAL; INTRAMUSCULAR; INTRAVENOUS; SOFT TISSUE at 08:13

## 2023-06-29 RX ADMIN — SODIUM CHLORIDE, SODIUM LACTATE, POTASSIUM CHLORIDE, CALCIUM CHLORIDE: 600; 310; 30; 20 INJECTION, SOLUTION INTRAVENOUS at 07:32

## 2023-06-29 RX ADMIN — FENTANYL CITRATE 50 MCG: 50 INJECTION, SOLUTION INTRAMUSCULAR; INTRAVENOUS at 08:42

## 2023-06-29 RX ADMIN — PROPOFOL 20 MG: 10 INJECTION, EMULSION INTRAVENOUS at 08:40

## 2023-06-29 NOTE — ANESTHESIA CARE TRANSFER NOTE
Patient: Hon Kurtz    Procedure: Procedure(s):  left image-guided endoscopic sphenoidotomy with tissue removal, partial ethmoidectomy       Diagnosis: Sphenoid mass [J34.89]  Diagnosis Additional Information: No value filed.    Anesthesia Type:   General     Note:    Oropharynx: spontaneously breathing  Level of Consciousness: awake  Oxygen Supplementation: face mask  Level of Supplemental Oxygen (L/min / FiO2): 8  Independent Airway: airway patency satisfactory and stable  Dentition: dentition unchanged  Vital Signs Stable: post-procedure vital signs reviewed and stable  Report to RN Given: handoff report given  Patient transferred to: PACU    Handoff Report: Identifed the Patient, Identified the Reponsible Provider, Reviewed the pertinent medical history, Discussed the surgical course, Reviewed Intra-OP anesthesia mangement and issues during anesthesia, Set expectations for post-procedure period and Allowed opportunity for questions and acknowledgement of understanding      Vitals:  Vitals Value Taken Time   /86 06/29/23 0956   Temp 96.6    Pulse 65 06/29/23 0958   Resp 37 06/29/23 0958   SpO2 100 % 06/29/23 0958   Vitals shown include unvalidated device data.    Electronically Signed By: LEVI Villalta CRNA  June 29, 2023  9:59 AM

## 2023-06-29 NOTE — ANESTHESIA POSTPROCEDURE EVALUATION
Patient: Hon Kurtz    Procedure: Procedure(s):  left image-guided endoscopic sphenoidotomy with tissue removal, partial ethmoidectomy       Anesthesia Type:  General    Note:  Disposition: Outpatient   Postop Pain Control: Uneventful            Sign Out: Well controlled pain   PONV: No   Neuro/Psych: Uneventful            Sign Out: Acceptable/Baseline neuro status   Airway/Respiratory: Uneventful            Sign Out: Acceptable/Baseline resp. status   CV/Hemodynamics: Uneventful            Sign Out: Acceptable CV status; No obvious hypovolemia; No obvious fluid overload   Other NRE: NONE   DID A NON-ROUTINE EVENT OCCUR? No           Last vitals:  Vitals Value Taken Time   /84 06/29/23 1023   Temp 36.2  C (97.2  F) 06/29/23 1023   Pulse 70 06/29/23 1024   Resp 17 06/29/23 1024   SpO2 100 % 06/29/23 1024   Vitals shown include unvalidated device data.    Electronically Signed By: Quynh Newton MD  June 29, 2023  10:45 AM

## 2023-06-29 NOTE — ANESTHESIA PREPROCEDURE EVALUATION
Anesthesia Pre-Procedure Evaluation    Patient: Hon Kurtz   MRN: [id number] : 1960        Procedure : Procedure(s):  left image-guided endoscopic sphenoidotomy with tissue removal, partial ethmoidectomy          No past medical history on file.   Past Surgical History:   Procedure Laterality Date     TUBAL LIGATION        No Known Allergies   Social History     Tobacco Use     Smoking status: Never     Smokeless tobacco: Never   Substance Use Topics     Alcohol use: Not on file      Wt Readings from Last 1 Encounters:   23 50.8 kg (112 lb)        Anesthesia Evaluation   Pt has had prior anesthetic.         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      Cardiovascular:  - neg cardiovascular ROS     METS/Exercise Tolerance:     Hematologic:  - neg hematologic  ROS     Musculoskeletal:  - neg musculoskeletal ROS     GI/Hepatic:     (+) hepatitis type B,     Renal/Genitourinary:  - neg Renal ROS     Endo:  - neg endo ROS     Psychiatric/Substance Use:     (+) psychiatric history     Infectious Disease:  - neg infectious disease ROS     Malignancy:       Other:               OUTSIDE LABS:  CBC:   Lab Results   Component Value Date    WBC 3.6 (L) 2011    HGB 14.0 2011    HCT 42.5 2011     (L) 2011     BMP:   Lab Results   Component Value Date     2023     2022    POTASSIUM 3.8 2023    POTASSIUM 3.8 2022    CHLORIDE 106 2023    CHLORIDE 109 2022    CO2 21 (L) 2023    CO2 27 2022    BUN 18.3 2023    BUN 11 2022    CR 0.55 2023    CR 0.57 2022     (H) 2023    GLC 85 2022     COAGS:   Lab Results   Component Value Date    PTT 26 2011    INR 0.94 2011     POC: No results found for: BGM, HCG, HCGS  HEPATIC:   Lab Results   Component Value Date    ALBUMIN 4.3 2023    PROTTOTAL 7.7  02/27/2023    ALT 15 02/27/2023    AST 23 02/27/2023    ALKPHOS 121 (H) 02/27/2023    BILITOTAL 0.6 02/27/2023     OTHER:   Lab Results   Component Value Date    VIRGILIO 9.3 02/27/2023       Anesthesia Plan    ASA Status:  2   NPO Status:  NPO Appropriate    Anesthesia Type: General.     - Airway: ETT   Induction: Intravenous.   Maintenance: Balanced.        Consents    Anesthesia Plan(s) and associated risks, benefits, and realistic alternatives discussed. Questions answered and patient/representative(s) expressed understanding.    - Discussed:     - Discussed with:  Patient         Postoperative Care    Pain management: Multi-modal analgesia.   PONV prophylaxis: Ondansetron (or other 5HT-3), Dexamethasone or Solumedrol     Comments:           H&P reviewed: Unable to attach H&P to encounter due to EHR limitations. H&P Update: appropriate H&P reviewed, patient examined. No interval changes since H&P (within 30 days).         Quynh Netwon MD

## 2023-06-29 NOTE — DISCHARGE INSTRUCTIONS
"Dr. Jade's Surgical Discharge Instructions  1. Start rinsing the nasal cavities with ocean saline spray or saline irrigations as directed on prescription. This will assist with healing after surgery.   2. Take medications as prescribed.    3. You have been prescribed three medications: a narcotic pain medication (oxycodone) to take as needed for pain not controlled with tylenol; a medication for nausea (ondansetron, aka zofran) to use for nausea and/or vomiting; and, a nasal saline spray. An antibiotic will be prescribed if there was evidence of infection during surgery and cultures were obtained at the time of surgery.    4. Do not drive or operate machinery while taking narcotic pain medication.   5. For nasal bleeding that is bothersome or excessive, spray afrin (oxymetazoline) nasal spray (four sprays into each nostril) and pinch the tip of the nose closed for 10 minutes. If you find you have done this four times in one hour and are still having bothersome bleeding, please call your doctor. You will be given a bottle of afrin at the time of discharge.  This was used at the end of your surgery in your own nose, so you will find that the bottle is already opened, and may have some small streaks of blood on the tip of the bottle. Please do not be alarmed, as this was used on you, and you alone!  6. There are no activity restrictions after surgery, but please \"listen to your body\". If you feel like you are doing too much, please reduce your activity level. The one restriction I have is that you avoid excessive nose-blowing, as this can lead to nasal bleeding.   7. Please call your doctor for any headache not controlled with pain medication, severe nausea or vomiting, fevers >100.4, changes in mental status, or any other concerning symptoms you may identify.      Cuco aJde MD    [facility]  Center for Skull Base and Pituitary Surgery  [facility]  Department of " Otolaryngology - Head & Neck Surgery      [facility]  Home Care Following Anesthesia  For 24 hours after surgery:  Get plenty of rest.  A responsible adult must stay with you for at least 24 hours after you leave the surgery center.  Do not drive or use heavy equipment.  If you have weakness or tingling, don't drive or use heavy equipment until this feeling goes away.   Do not drink alcohol.   Avoid strenuous or risky activities.  Ask for help when climbing stairs.  You may feel lightheaded.  IF so, sit for a few minutes before standing.  Have someone help you get up.   If you have nausea (feel sick to your stomach): Drink only clear liquids such as apple juice, ginger ale, broth or 7-Up.  Rest may also help.  Be sure to drink enough fluids.  Move to a regular diet as you feel able.   You may have a slight fever.  Call the doctor if your fever is over 100 F (37.7 C) (taken under the tongue) or lasts longer than 24 hours.  You may have a dry mouth, a sore throat, muscle aches or trouble sleeping. These should go away after 24 hours.  Do not make important or legal decisions.   It is recommended to avoid smoking.               Tips for taking pain medications  To get the best pain relief possible, remember these points:  Take pain medications as directed, before pain becomes severe.  Pain medication can upset your stomach: taking it with food may help.  Constipation is a common side effect of pain medication. Drink plenty of  fluids.  Eat foods high in fiber. Take a stool softener if recommended by your doctor or pharmacist.  Do not drink alcohol, drive or operate machinery while taking pain medications.  Ask about other ways to control pain, such as with heat, ice or relaxation.    Tylenol/Acetaminophen Consumption    If you feel your pain relief is insufficient, you may take Tylenol/Acetaminophen in addition to your narcotic pain medication.   Be careful not to exceed 4,000 mg  of Tylenol/Acetaminophen in a 24 hour period from all sources.  If you are taking extra strength Tylenol/acetaminophen (500 mg), the maximum dose is 8 tablets in 24 hours.  If you are taking regular strength acetaminophen (325 mg), the maximum dose is 12 tablets in 24 hours.    Call a doctor for any of the following:  Signs of infection (fever, growing tenderness at the surgery site, a large amount of drainage or bleeding, severe pain, foul-smelling drainage, redness, swelling).  It has been over 8 to 10 hours since surgery and you are still not able to urinate (pass water).  Headache for over 24 hours.  Numbness, tingling or weakness the day after surgery (if you had spinal anesthesia).  Signs of Covid-19 infection (temperature over 100 degrees, shortness of breath, cough, loss of taste/smell, generalized body aches, persistent headache, chills, sore throat, nausea/vomiting/diarrhea)  Your doctor is:       Dr. Cuco Jade, ENT Otolaryngology: [phone]               Or dial [phone] and ask for the resident on call for:  ENT Otolaryngology  For emergency care, call the:  [facility]:  [phone] (TTY for hearing impaired: [phone])

## 2023-06-29 NOTE — OP NOTE
Otolaryngology Operative Report   Date of Operation: 2023  Patient Name: Hon Kurtz  Patient : 1960   Patient MRN: [id number]    Staff Surgeon: Cuco Jade MD  Resident Surgeon: Alfonso Vee MD  Preoperative Diagnosis:   1. Sphenoid Mass  Postoperative Diagnosis:    1. Left Sphenoid Infection  Procedure:   1. Left  Endoscopic Sphenoidotomy with tissue removal  2. Left Endoscopic Partial Ethmoidectomy  3. Image Guided Endoscopic Sinus Surgery    Anesthesia: General  Findings: Left sphenoid rhinolith.  The areas of dehiscence identified on preoperative CT scan were observed and no CSF leak was noted.  Propel stent placed with nasopore at the end of the case.  EBL: 5cc  Complications: None  Specimens: Left sphenoid tissue for culture    Clinical Indications: Hon Kurtz is a 63 year old female with history of headaches who underwent work-up with MRI that demonstrated left sphenoid mass versus infection.  She was recommended to undergo aforementione procedure. All risks and benefits were discussed with the consenting party and they elected to proceed with the procedure.  Description of Procedure: The patient was brought to the operating room and placed in supine position on the operating table. General anesthesia was induced and all appropriate monitoring lines were placed.  The patient was then turned 180 degrees and a timeout was performed with all members of the operating room in agreement with the patient name and procedure being performed.  We began by registering our Stealth sinus navigation and accuracy was confirmed. The patient was then prepped and draped in usual sterile fashion. The nasal cavity was decongested with epinephrine soaked pledgits. Local anesthesia was injected to the lateral attachment of the middle turbinate and head of the middle turbinate; a total of 3cc of 1% lidocaine with 1:100,000 epinephrine was used. We then began by using the 0-degree sinus endoscope and  lateralizing both the inferior turbinate and middle turbinate with a freer elevator. The Superior turbinate was identified and using a transnasal approach the sphenoid ostium was identified. There was inflammatory soft tissue obstructing the opening of the sphenoid ostium and navigation was used to confirm the position of the sphenoid ostium. A straight suction was used to enter the sphenoid ostium and dilate the opening. A sinus shaver was brought and the soft tissue obstructing the ostium was cleared carefully. A straight through cut was used to remove the soft tissue along the superolateral aspect of the ostium opening. The monopolar cautery was then used to create an incision along the rostrum to elevate and the mucosa overlying the vomer and sphenoid rostrum. The underlying bony rostrum was then carefully removed to improve the access to the sphenoid sinus. A down-kerasin was used to careful remove the inferior border of the sphenoid ostium and a shaver was used to remove the excess tissue. The sphenoid was well visualized and there was copious yellow debris and some purulence noted in the sinus; this was carefully removed with a combination of suction and straight blakesely. Specimen was collected via a Lukens-trap and sent for tissue culture. The sinus was irrigated gently given the known dehiscence and suctioned free of contents. To improve access the sinus drill was used to further enhance the inferior aspect of the sphenoidotomy. The suction cautery was used to obtain hemostasis from a few small branches off of the SPA. A 30-degree scope was then used to assess the inferolateral and inferomedial aspects of the sphenoid sinus. The sinus appeared patent with all debris evacuated. The sinus cavity was then irrigated and re-assessed. Then, because there was inflamed and obstructed mucosa overlying the posterior mucosa and superior meatus, several posterior ethmoid partitions were removed using angled  through-cutting instruments and shaver. Then, using a straight thru cut the inferior aspect of the middle turbinate was excised to facilitate healing and continued aeration/drainage of the sphenoid sinus, an the basal lamella was removed transnasally towards the orbit. A propel stent was placed into the sphenoid sinus and a nasopore was carefully placed anterior to the stent along the medial aspect of the middle turbinate. Afrin was placed into the nasal cavity for additional hemostasis. An orogastric tube was passed to suction out the stomach contents.  This concluded our procedure. The patient was then turned over to our anesthesia colleagues and was extubated and taken to the PACU in satisfactory and stable condition. The patient's  was updated regarding the outcome of surgery.     Dr. Jade was present for the entirety of the procedure    I, Cuco Jade MD, was present during the key portions of the procedure, and I was immediately available for the entire procedure between opening and closing.

## 2023-06-29 NOTE — BRIEF OP NOTE
[facility]    Brief Operative Note    Pre-operative diagnosis: Sphenoid mass [J34.89]  Post-operative diagnosis Same as pre-operative diagnosis    Procedure: Procedure(s):  left image-guided endoscopic sphenoidotomy with tissue removal, partial ethmoidectomy  Surgeon: Surgeon(s) and Role:     * Cuco Jade MD - Primary     * Alfonso Vee MD - Resident - Assisting  Anesthesia: General   Estimated Blood Loss: Minimal    Drains: None  Specimens:   ID Type Source Tests Collected by Time Destination   A : left sphenoid tissue Tissue Nose ANAEROBIC BACTERIAL CULTURE ROUTINE, FUNGAL OR YEAST CULTURE ROUTINE, AEROBIC BACTERIAL CULTURE ROUTINE Cuco Jade MD 6/29/2023  9:03 AM      Findings:   None.  Complications: None.  Implants:   Implant Name Type Inv. Item Serial No.  Lot No. LRB No. Used Action   IMP SINUS PROPEL MOMETASONE FUORATE [id number] - [id number] Other IMP SINUS PROPEL MOMETASONE FUORATE [id number]  [employer] [facility] [id number] Left 1 Implanted

## 2023-06-30 ENCOUNTER — APPOINTMENT (OUTPATIENT)
Dept: INTERPRETER SERVICES | Facility: CLINIC | Age: 63
End: 2023-06-30
Payer: COMMERCIAL

## 2023-07-04 LAB — BACTERIA TISS BX CULT: NORMAL

## 2023-07-05 LAB — BACTERIA TISS BX CULT: ABNORMAL

## 2023-07-07 ENCOUNTER — OFFICE VISIT (OUTPATIENT)
Dept: OTOLARYNGOLOGY | Facility: CLINIC | Age: 63
End: 2023-07-07
Payer: COMMERCIAL

## 2023-07-07 VITALS
BODY MASS INDEX: 22.58 KG/M2 | HEIGHT: 59 IN | SYSTOLIC BLOOD PRESSURE: 114 MMHG | WEIGHT: 112 LBS | HEART RATE: 83 BPM | DIASTOLIC BLOOD PRESSURE: 76 MMHG

## 2023-07-07 DIAGNOSIS — J34.1 PARANASAL SINUS MUCOCELE: ICD-10-CM

## 2023-07-07 DIAGNOSIS — J34.89 SPHENOID MASS: Primary | ICD-10-CM

## 2023-07-07 PROCEDURE — 31237 NSL/SINS NDSC SURG BX POLYPC: CPT | Mod: LT | Performed by: OTOLARYNGOLOGY

## 2023-07-07 ASSESSMENT — PAIN SCALES - GENERAL: PAINLEVEL: NO PAIN (0)

## 2023-07-07 NOTE — PATIENT INSTRUCTIONS
You were seen in the ENT Clinic today by Dr. Jade. If you have any questions or concerns after your appointment, please contact us (see below)       2.   The following recommendations have been made based upon your appointment today:   -Start sinus rinses twice daily until follow up. Additional instructions are included below.      3.   Please return to the ENT clinic in 4 weeks.           How to Contact Us:  Send a [employer] message to your provider. Our team will respond to you via [employer]. Occasionally, we will need to call you to get further information.  For urgent matters (Monday-Friday), call the ENT Clinic: [phone] and speak with a call center team member - they will route your call appropriately.   If you'd like to speak directly with a nurse, please find our contact information below. We do our best to check voicemail frequently throughout the day, and will work to call you back within 1-2 days. For urgent matters, please use the general clinic phone numbers listed above.        Shabnam MONTEMAYOR RN  ENT RN Care Coordinator  Direct: [phone]  Latanya GUERRA LPN  Direct: [phone]         [facility]  Department of Otolaryngology         Cleaning of the Nasal or Sinus Cavity    Please follow all three steps.  Nasal irrigation (cleaning) should be done 2 times/day.    Preparation:  1.  Wash your hands  2.  We suggest that you buy the [employer] Sinus Rinse Kit  3.  Use distilled water or tap water that has been boiled and brought to room temperature.  4.  Fill the irrigation bottle with room temperature water (distilled or boiled tap water) and add mixture (pre made packet or homemade recipe).        If you wish to make a homemade recipe:        Mix 1/4 teaspoon (kosher, non-iodine) salt with 1/4 teaspoon baking soda in each bottle.    Cleansing Irrigation/Sinus Rinse:    1.  Lean forward over a sink and insert the rinse bottle applicator into the right side of your nose.    2.  Tilt head down and to the left  side.  With your mouth open (breathing through your mouth), gently direct the water around the inside of your nose until clear fluid starts to drain from the opposite nostril.  This is called flushing or irrigation.    3.  When you have used 1/4 to 1/2 or the solution, switch to the left nostril.    4.  To irrigate the left nostril, tilt your head down and to the right side.  With your mouth open (breathing through your mouth),  gently direct the water around the inside of your nose until clear fluid starts to drain from the opposite nostril.     Cleaning the Equipment:  1.  Throw away any leftover solution  2.  Clean the rinse bottle and cap with clear water. Air dry.      Call the ENT Clinic if you have any questions or concerns at [phone]

## 2023-07-07 NOTE — LETTER
7/7/2023       RE: Hon Kurtz  [address]  [place] [address]     Dear Colleague,    Thank you for referring your patient, Hon Kurtz, to the [facility] [place] at [facility]. Please see a copy of my visit note below.                  [facility]                         Return Visit      Encounter date: July 7, 2023    Chief Complaint: post-op    ID: sphenoid fungus ball    Interval History: Hon Kurtz   First postoperative visit  Doing well  No bleeding  No headaches    [place] Operative [facility]  Staff Surgeon: Cuco Jade MD  Resident Surgeon: Alfonso Vee MD  Preoperative Diagnosis:   1. Sphenoid Mass  Postoperative Diagnosis:                1. Left Sphenoid Infection  Procedure:   1. Left  Endoscopic Sphenoidotomy with tissue removal  2. Left Endoscopic Partial Ethmoidectomy  3. Image Guided Endoscopic Sinus Surgery       Review of systems: A 14-point review of systems has been conducted and is negative for any notable symptoms, except as dictated in the history of present illness.     Physical Exam:  Vital signs: There were no vitals taken for this visit.   General Appearance: No acute distress, appropriate demeanor, conversant  Eyes: moist conjunctivae; EOMI; pupils symmetric; visual acuity grossly intact; no proptosis  Head: normocephalic; overall symmetric appearance without deformity  Face: overall symmetric without deformity; HB I/VI  Ears: Normal appearance of external ear; external meatus normal in appearance; TMs intact without perforation bilaterally;   Nose: No external deformity; see     Procedure Note  Procedure performed: Rigid nasal endoscopy with left-sided debridement  Indication: To evaluate for sinonasal pathology not visualized on routine anterior rhinoscopy  Anesthesia: 4% topical lidocaine with 0.05 % oxymetazoline  Description of procedure: A 30 degree, 3 mm rigid endoscope  was inserted into bilateral nasal cavities and the nasal valves, nasal cavity, middle meatus, sphenoethmoid recess, nasopharynx were evaluated for evidence of obstruction, edema, purulence, polyps and/or mass/lesion.     Findings  LT: Well-healing sphenoid cavity without evidence of residual fungus ball    The patient tolerated the procedure well without complication.     Laboratory Review:  Culture 2+ Aspergillus flavus Abnormal                Imaging Review:  n/a    Pathology Review:  n/a    Assessment/Medical Decision Making:  Left sphenoid fungus ball s/p ESS as above    Left endoscopy with debridement - healing well      Plan:  Start rinses  Follow-up in 4 weeks    Cuco Jade MD    [facility]  [facility]  [facility]  Department of Otolaryngology - Head & Neck Surgery    Additional portions of the patient's have been reviewed below.   ~~~~~~~~~~~~~~~~~~~~~~~~~~~~~~~~~~~~~~~~~~~~~~~~~~~~~~~~~~~~~~~~~~~~~~~~~~~~~~~~~~~~~~~~~~~~~~~~~~~~~~~~~~~~~~~~~~~~~~~~~~~~~~~~~~~~~~~    No past medical history on file.     Past Surgical History:   Procedure Laterality Date    OPTICAL TRACKING SYSTEM ENDOSCOPIC SINUS SURGERY Left 6/29/2023    Procedure: left image-guided endoscopic sphenoidotomy with tissue removal, partial ethmoidectomy;  Surgeon: Cuco Jade MD;  Location: [facility] OR    TUBAL LIGATION          No family history on file.     Social History     Socioeconomic History    Marital status:    Tobacco Use    Smoking status: Never    Smokeless tobacco: Never          Again, thank you for allowing me to participate in the care of your patient.      Sincerely,    Cuco Jade MD

## 2023-07-07 NOTE — PROGRESS NOTES
[facility]                       Return Visit      Encounter date: July 7, 2023    Chief Complaint: post-op    ID: sphenoid fungus ball    Interval History: Hon Kurtz   First postoperative visit  Doing well  No bleeding  No headaches    [place] Operative History  Staff Surgeon: Cuco Jade MD  Resident Surgeon: Alfonso Vee MD  Preoperative Diagnosis:   1. Sphenoid Mass  Postoperative Diagnosis:                1. Left Sphenoid Infection  Procedure:   1. Left  Endoscopic Sphenoidotomy with tissue removal  2. Left Endoscopic Partial Ethmoidectomy  3. Image Guided Endoscopic Sinus Surgery       Review of systems: A 14-point review of systems has been conducted and is negative for any notable symptoms, except as dictated in the history of present illness.     Physical Exam:  Vital signs: There were no vitals taken for this visit.   General Appearance: No acute distress, appropriate demeanor, conversant  Eyes: moist conjunctivae; EOMI; pupils symmetric; visual acuity grossly intact; no proptosis  Head: normocephalic; overall symmetric appearance without deformity  Face: overall symmetric without deformity; HB I/VI  Ears: Normal appearance of external ear; external meatus normal in appearance; TMs intact without perforation bilaterally;   Nose: No external deformity; see     Procedure Note  Procedure performed: Rigid nasal endoscopy with left-sided debridement  Indication: To evaluate for sinonasal pathology not visualized on routine anterior rhinoscopy  Anesthesia: 4% topical lidocaine with 0.05 % oxymetazoline  Description of procedure: A 30 degree, 3 mm rigid endoscope was inserted into bilateral nasal cavities and the nasal valves, nasal cavity, middle meatus, sphenoethmoid recess, nasopharynx were evaluated for evidence of obstruction, edema, purulence, polyps and/or mass/lesion.     Findings  LT: Well-healing sphenoid cavity without evidence of residual fungus ball    The  patient tolerated the procedure well without complication.     Laboratory Review:  Culture 2+ Aspergillus flavus Abnormal                Imaging Review:  n/a    Pathology Review:  n/a    Assessment/Medical Decision Making:  Left sphenoid fungus ball s/p ESS as above    Left endoscopy with debridement - healing well      Plan:  Start rinses  Follow-up in 4 weeks    Cuco Jade MD    [facility]  [facility]  [facility]  Department of Otolaryngology - Head & Neck Surgery    Additional portions of the patient's have been reviewed below.   ~~~~~~~~~~~~~~~~~~~~~~~~~~~~~~~~~~~~~~~~~~~~~~~~~~~~~~~~~~~~~~~~~~~~~~~~~~~~~~~~~~~~~~~~~~~~~~~~~~~~~~~~~~~~~~~~~~~~~~~~~~~~~~~~~~~~~~~    No past medical history on file.     Past Surgical History:   Procedure Laterality Date     OPTICAL TRACKING SYSTEM ENDOSCOPIC SINUS SURGERY Left 6/29/2023    Procedure: left image-guided endoscopic sphenoidotomy with tissue removal, partial ethmoidectomy;  Surgeon: Cuco Jade MD;  Location: [facility] OR     TUBAL LIGATION          No family history on file.     Social History     Socioeconomic History     Marital status:    Tobacco Use     Smoking status: Never     Smokeless tobacco: Never

## 2023-07-27 LAB — BACTERIA TISS BX CULT: ABNORMAL

## 2023-08-09 ENCOUNTER — OFFICE VISIT (OUTPATIENT)
Dept: OTOLARYNGOLOGY | Facility: CLINIC | Age: 63
End: 2023-08-09
Payer: COMMERCIAL

## 2023-08-09 VITALS
BODY MASS INDEX: 22.58 KG/M2 | HEART RATE: 77 BPM | HEIGHT: 59 IN | WEIGHT: 112 LBS | OXYGEN SATURATION: 96 % | SYSTOLIC BLOOD PRESSURE: 115 MMHG | DIASTOLIC BLOOD PRESSURE: 67 MMHG

## 2023-08-09 DIAGNOSIS — J34.1 PARANASAL SINUS MUCOCELE: ICD-10-CM

## 2023-08-09 DIAGNOSIS — J34.89 SPHENOID MASS: Primary | ICD-10-CM

## 2023-08-09 PROCEDURE — 31231 NASAL ENDOSCOPY DX: CPT | Performed by: OTOLARYNGOLOGY

## 2023-08-09 ASSESSMENT — PAIN SCALES - GENERAL: PAINLEVEL: NO PAIN (0)

## 2023-08-09 NOTE — PATIENT INSTRUCTIONS
You were seen in the ENT Clinic today by Dr. Jade. If you have any questions or concerns after your appointment, please contact us (see below)       2.   The following recommendations have been made based upon your appointment today:   -Continue sinus rinses as needed.      3.   Please return to the ENT clinic as needed.           How to Contact Us:  Send a [employer]ov[employer] message to your provider. Our team will respond to you via [employer]. Occasionally, we will need to call you to get further information.  For urgent matters (Monday-Friday), call the ENT Clinic: [phone] and speak with a call center team member - they will route your call appropriately.   If you'd like to speak directly with a nurse, please find our contact information below. We do our best to check voicemail frequently throughout the day, and will work to call you back within 1-2 days. For urgent matters, please use the general clinic phone numbers listed above.        Shabnam MONTEMAYOR RN  ENT RN Care Coordinator  Direct: [phone]  Latanya GUERRA LPN  Direct: [phone]         [facility]  Department of Otolaryngology

## 2023-08-09 NOTE — LETTER
"8/9/2023       RE: Hon Kurtz  [address]  [place] [place] [facility] [address]     Dear Colleague,    Thank you for referring your patient, Hon Kurtz, to the [facility] [place] at [facility]. Please see a copy of my visit note below.                         [facility]                         Return Visit      Encounter date: August 9, 2023    Chief Complaint: post-op    ID: sphenoid fungus ball    Interval History:   Hon Kurtz   Second postoperative visit  Using rinses  No drainage    [place] Operative History  Staff Surgeon: Cuco Jade MD  Resident Surgeon: Alfonso Vee MD  Preoperative Diagnosis:   1. Sphenoid Mass  Postoperative Diagnosis:                1. Left Sphenoid Infection  Procedure:   1. Left  Endoscopic Sphenoidotomy with tissue removal  2. Left Endoscopic Partial Ethmoidectomy  3. Image Guided Endoscopic Sinus Surgery       Review of systems: A 14-point review of systems has been conducted and is negative for any notable symptoms, except as dictated in the history of present illness.     Physical Exam:  Vital signs: /67 (BP Location: Left arm, Patient Position: Sitting, Cuff Size: Adult Regular)   Pulse 77   Ht 1.499 m (4' 11\")   Wt 50.8 kg (112 lb)   SpO2 96%   BMI 22.62 kg/m     General Appearance: No acute distress, appropriate demeanor, conversant  Eyes: moist conjunctivae; EOMI; pupils symmetric; visual acuity grossly intact; no proptosis  Head: normocephalic; overall symmetric appearance without deformity  Face: overall symmetric without deformity; HB I/VI  Ears: Normal appearance of external ear; external meatus normal in appearance; TMs intact without perforation bilaterally;   Nose: No external deformity; see     Procedure Note  Procedure performed: Rigid nasal endoscopy   Indication: To evaluate for sinonasal pathology not visualized on routine anterior rhinoscopy  Anesthesia: 4% " topical lidocaine with 0.05 % oxymetazoline  Description of procedure: A 30 degree, 3 mm rigid endoscope was inserted into bilateral nasal cavities and the nasal valves, nasal cavity, middle meatus, sphenoethmoid recess, nasopharynx were evaluated for evidence of obstruction, edema, purulence, polyps and/or mass/lesion.     Findings  LT: Well-healed sphenoid cavity without evidence of recurrent fungus ball    The patient tolerated the procedure well without complication.     Laboratory Review:  Culture 2+ Aspergillus flavus Abnormal                Imaging Review:  n/a    Pathology Review:  n/a    Assessment/Medical Decision Making:  Left sphenoid fungus ball s/p ESS as above    Left endoscopy with debridement - healing well      Plan:  Nasal endoscopy-looks great  Rinse as needed  Follow-up as needed    Cuco Jade MD    [facility]  [facility] Pituitary [facility]  [facility]  Department of Otolaryngology - Head & Neck Surgery    Additional portions of the patient's have been reviewed below.   ~~~~~~~~~~~~~~~~~~~~~~~~~~~~~~~~~~~~~~~~~~~~~~~~~~~~~~~~~~~~~~~~~~~~~~~~~~~~~~~~~~~~~~~~~~~~~~~~~~~~~~~~~~~~~~~~~~~~~~~~~~~~~~~~~~~~~~~    No past medical history on file.     Past Surgical History:   Procedure Laterality Date    OPTICAL TRACKING SYSTEM ENDOSCOPIC SINUS SURGERY Left 6/29/2023    Procedure: left image-guided endoscopic sphenoidotomy with tissue removal, partial ethmoidectomy;  Surgeon: Cuco Jade MD;  Location: [facility] OR    TUBAL LIGATION          No family history on file.     Social History     Socioeconomic History    Marital status:    Tobacco Use    Smoking status: Never    Smokeless tobacco: Never            Again, thank you for allowing me to participate in the care of your patient.      Sincerely,    Cuco Jade MD

## 2023-08-09 NOTE — PROGRESS NOTES
"                   [facility]                       Return Visit      Encounter date: August 9, 2023    Chief Complaint: post-op    ID: sphenoid fungus ball    Interval History:   Hon Kurtz   Second postoperative visit  Using rinses  No drainage    [place] Operative History  Staff Surgeon: Cuco Jade MD  Resident Surgeon: Alfonso Vee MD  Preoperative Diagnosis:   1. Sphenoid Mass  Postoperative Diagnosis:                1. Left Sphenoid Infection  Procedure:   1. Left  Endoscopic Sphenoidotomy with tissue removal  2. Left Endoscopic Partial Ethmoidectomy  3. Image Guided Endoscopic Sinus Surgery       Review of systems: A 14-point review of systems has been conducted and is negative for any notable symptoms, except as dictated in the history of present illness.     Physical Exam:  Vital signs: /67 (BP Location: Left arm, Patient Position: Sitting, Cuff Size: Adult Regular)   Pulse 77   Ht 1.499 m (4' 11\")   Wt 50.8 kg (112 lb)   SpO2 96%   BMI 22.62 kg/m     General Appearance: No acute distress, appropriate demeanor, conversant  Eyes: moist conjunctivae; EOMI; pupils symmetric; visual acuity grossly intact; no proptosis  Head: normocephalic; overall symmetric appearance without deformity  Face: overall symmetric without deformity; HB I/VI  Ears: Normal appearance of external ear; external meatus normal in appearance; TMs intact without perforation bilaterally;   Nose: No external deformity; see     Procedure Note  Procedure performed: Rigid nasal endoscopy   Indication: To evaluate for sinonasal pathology not visualized on routine anterior rhinoscopy  Anesthesia: 4% topical lidocaine with 0.05 % oxymetazoline  Description of procedure: A 30 degree, 3 mm rigid endoscope was inserted into bilateral nasal cavities and the nasal valves, nasal cavity, middle meatus, sphenoethmoid recess, nasopharynx were evaluated for evidence of obstruction, edema, purulence, polyps and/or mass/lesion. "     Findings  LT: Well-healed sphenoid cavity without evidence of recurrent fungus ball    The patient tolerated the procedure well without complication.     Laboratory Review:  Culture 2+ Aspergillus flavus Abnormal                Imaging Review:  n/a    Pathology Review:  n/a    Assessment/Medical Decision Making:  Left sphenoid fungus ball s/p ESS as above    Left endoscopy with debridement - healing well      Plan:  Nasal endoscopy-looks great  Rinse as needed  Follow-up as needed    Cuco Jade MD    [facility]  [facility] and Pituitary Surgery  [facility]  Department of Otolaryngology - Head & Neck Surgery    Additional portions of the patient's have been reviewed below.   ~~~~~~~~~~~~~~~~~~~~~~~~~~~~~~~~~~~~~~~~~~~~~~~~~~~~~~~~~~~~~~~~~~~~~~~~~~~~~~~~~~~~~~~~~~~~~~~~~~~~~~~~~~~~~~~~~~~~~~~~~~~~~~~~~~~~~~~    No past medical history on file.     Past Surgical History:   Procedure Laterality Date    OPTICAL TRACKING SYSTEM ENDOSCOPIC SINUS SURGERY Left 6/29/2023    Procedure: left image-guided endoscopic sphenoidotomy with tissue removal, partial ethmoidectomy;  Surgeon: Cuco Jade MD;  Location: [facility] OR    TUBAL LIGATION          No family history on file.     Social History     Socioeconomic History    Marital status:    Tobacco Use    Smoking status: Never    Smokeless tobacco: Never

## 2024-02-29 ENCOUNTER — LAB REQUISITION (OUTPATIENT)
Dept: LAB | Facility: CLINIC | Age: 64
End: 2024-02-29
Payer: COMMERCIAL

## 2024-02-29 DIAGNOSIS — Z00.00 ENCOUNTER FOR GENERAL ADULT MEDICAL EXAMINATION WITHOUT ABNORMAL FINDINGS: ICD-10-CM

## 2024-02-29 LAB — HBA1C MFR BLD: 6.2 %

## 2024-02-29 PROCEDURE — 80048 BASIC METABOLIC PNL TOTAL CA: CPT | Mod: ORL | Performed by: INTERNAL MEDICINE

## 2024-02-29 PROCEDURE — 83036 HEMOGLOBIN GLYCOSYLATED A1C: CPT | Mod: ORL | Performed by: INTERNAL MEDICINE

## 2024-02-29 PROCEDURE — 80061 LIPID PANEL: CPT | Mod: ORL | Performed by: INTERNAL MEDICINE

## 2024-03-01 LAB
ANION GAP SERPL CALCULATED.3IONS-SCNC: 13 MMOL/L (ref 7–15)
BUN SERPL-MCNC: 15.3 MG/DL (ref 8–23)
CALCIUM SERPL-MCNC: 9.3 MG/DL (ref 8.8–10.2)
CHLORIDE SERPL-SCNC: 103 MMOL/L (ref 98–107)
CHOLEST SERPL-MCNC: 188 MG/DL
CREAT SERPL-MCNC: 0.65 MG/DL (ref 0.51–0.95)
DEPRECATED HCO3 PLAS-SCNC: 24 MMOL/L (ref 22–29)
EGFRCR SERPLBLD CKD-EPI 2021: >90 ML/MIN/1.73M2
FASTING STATUS PATIENT QL REPORTED: NO
GLUCOSE SERPL-MCNC: 97 MG/DL (ref 70–99)
HDLC SERPL-MCNC: 85 MG/DL
LDLC SERPL CALC-MCNC: 87 MG/DL
NONHDLC SERPL-MCNC: 103 MG/DL
POTASSIUM SERPL-SCNC: 4.2 MMOL/L (ref 3.4–5.3)
SODIUM SERPL-SCNC: 140 MMOL/L (ref 135–145)
TRIGL SERPL-MCNC: 80 MG/DL

## 2025-03-03 ENCOUNTER — LAB REQUISITION (OUTPATIENT)
Dept: LAB | Facility: CLINIC | Age: 65
End: 2025-03-03
Payer: COMMERCIAL

## 2025-03-03 DIAGNOSIS — Z11.59 ENCOUNTER FOR SCREENING FOR OTHER VIRAL DISEASES: ICD-10-CM

## 2025-03-03 DIAGNOSIS — B18.1 CHRONIC VIRAL HEPATITIS B WITHOUT DELTA-AGENT (H): ICD-10-CM

## 2025-03-03 DIAGNOSIS — Z13.220 ENCOUNTER FOR SCREENING FOR LIPOID DISORDERS: ICD-10-CM

## 2025-03-03 DIAGNOSIS — Z79.899 OTHER LONG TERM (CURRENT) DRUG THERAPY: ICD-10-CM

## 2025-03-03 DIAGNOSIS — Z13.1 ENCOUNTER FOR SCREENING FOR DIABETES MELLITUS: ICD-10-CM

## 2025-03-03 LAB
ALBUMIN SERPL BCG-MCNC: 4.4 G/DL (ref 3.5–5.2)
ALP SERPL-CCNC: 151 U/L (ref 40–150)
ALT SERPL W P-5'-P-CCNC: 20 U/L (ref 0–50)
ANION GAP SERPL CALCULATED.3IONS-SCNC: 12 MMOL/L (ref 7–15)
AST SERPL W P-5'-P-CCNC: 23 U/L (ref 0–45)
BILIRUB SERPL-MCNC: 0.6 MG/DL
BUN SERPL-MCNC: 16.2 MG/DL (ref 8–23)
CALCIUM SERPL-MCNC: 9.3 MG/DL (ref 8.8–10.4)
CHLORIDE SERPL-SCNC: 105 MMOL/L (ref 98–107)
CHOLEST SERPL-MCNC: 199 MG/DL
CREAT SERPL-MCNC: 0.55 MG/DL (ref 0.51–0.95)
EGFRCR SERPLBLD CKD-EPI 2021: >90 ML/MIN/1.73M2
EST. AVERAGE GLUCOSE BLD GHB EST-MCNC: 128 MG/DL
FASTING STATUS PATIENT QL REPORTED: YES
FASTING STATUS PATIENT QL REPORTED: YES
GLUCOSE SERPL-MCNC: 99 MG/DL (ref 70–99)
HBA1C MFR BLD: 6.1 %
HBV CORE AB SERPL QL IA: REACTIVE
HBV SURFACE AG SERPL QL IA: REACTIVE
HCO3 SERPL-SCNC: 26 MMOL/L (ref 22–29)
HCV AB SERPL QL IA: NONREACTIVE
HDLC SERPL-MCNC: 94 MG/DL
LDLC SERPL CALC-MCNC: 96 MG/DL
NONHDLC SERPL-MCNC: 105 MG/DL
POTASSIUM SERPL-SCNC: 4.3 MMOL/L (ref 3.4–5.3)
PROT SERPL-MCNC: 7.6 G/DL (ref 6.4–8.3)
SODIUM SERPL-SCNC: 143 MMOL/L (ref 135–145)
TRIGL SERPL-MCNC: 44 MG/DL

## 2025-03-03 PROCEDURE — 87517 HEPATITIS B DNA QUANT: CPT | Mod: ORL | Performed by: INTERNAL MEDICINE

## 2025-03-03 PROCEDURE — 87340 HEPATITIS B SURFACE AG IA: CPT | Mod: ORL | Performed by: INTERNAL MEDICINE

## 2025-03-03 PROCEDURE — 80053 COMPREHEN METABOLIC PANEL: CPT | Mod: ORL | Performed by: INTERNAL MEDICINE

## 2025-03-03 PROCEDURE — 86707 HEPATITIS BE ANTIBODY: CPT | Mod: ORL | Performed by: INTERNAL MEDICINE

## 2025-03-03 PROCEDURE — 86704 HEP B CORE ANTIBODY TOTAL: CPT | Mod: ORL | Performed by: INTERNAL MEDICINE

## 2025-03-03 PROCEDURE — 83036 HEMOGLOBIN GLYCOSYLATED A1C: CPT | Mod: ORL | Performed by: INTERNAL MEDICINE

## 2025-03-03 PROCEDURE — 86803 HEPATITIS C AB TEST: CPT | Mod: ORL | Performed by: INTERNAL MEDICINE

## 2025-03-03 PROCEDURE — 80061 LIPID PANEL: CPT | Mod: ORL | Performed by: INTERNAL MEDICINE

## 2025-03-03 PROCEDURE — 87350 HEPATITIS BE AG IA: CPT | Mod: ORL | Performed by: INTERNAL MEDICINE

## 2025-03-04 LAB
HBV DNA SERPL NAA+PROBE-ACNC: 10 IU/ML
HBV DNA SERPL NAA+PROBE-LOG IU: 1 {LOG_IU}/ML
HBV E AB SERPL QL IA: POSITIVE
HBV E AG SERPL QL IA: NEGATIVE

## 2025-03-12 ENCOUNTER — LAB REQUISITION (OUTPATIENT)
Dept: LAB | Facility: CLINIC | Age: 65
End: 2025-03-12
Payer: COMMERCIAL

## 2025-03-12 DIAGNOSIS — Z12.11 ENCOUNTER FOR SCREENING FOR MALIGNANT NEOPLASM OF COLON: ICD-10-CM

## 2025-03-12 PROCEDURE — 82274 ASSAY TEST FOR BLOOD FECAL: CPT | Mod: ORL | Performed by: INTERNAL MEDICINE

## 2025-03-13 ENCOUNTER — MEDICAL CORRESPONDENCE (OUTPATIENT)
Dept: HEALTH INFORMATION MANAGEMENT | Facility: CLINIC | Age: 65
End: 2025-03-13
Payer: COMMERCIAL

## 2025-03-13 LAB — HEMOCCULT STL QL IA: NEGATIVE

## 2025-03-18 NOTE — CONFIDENTIAL NOTE
DIAGNOSIS:  Chronic viral hepatitis B without delta agent and without coma    Appt Date:  05.23.2025    NOTES STATUS DETAILS   OFFICE NOTE from referring provider Care Everywhere 03.03.2025  Ronaldo Hough MD [facility]   OFFICE NOTES from other specialists Care Everywhere 02.24.2022 Rissa Mead MD [facility]    02.10.2021 Elli Acuña MD  [facility]   MEDICATION LIST Care Everywhere / Internal    IMAGING     ULTRASOUND LIVER Internal 04.24.2023 US Abd Complete    LABS     BASIC METABOLIC PANEL Care Everywhere 02.29.2024   COMPLETE METABOLIC PANEL Care Everywhere 03.03.2025   COMPLETE BLOOD COUNT (CBC) Care Everywhere 03.03.2025   HEPATITIS C ANTIBODY Internal 03.03.2025   HEPATITIS B SURFACE ANTIGEN Internal 03.03.2025   HEPATITIS B SURFACE ANTIBODY Internal 03.03.2025   HEPATITIS B CORE ANTIBODY Internal 03.03.2025

## 2025-03-19 ENCOUNTER — ANCILLARY PROCEDURE (OUTPATIENT)
Dept: MAMMOGRAPHY | Facility: HOSPITAL | Age: 65
End: 2025-03-19
Attending: INTERNAL MEDICINE
Payer: COMMERCIAL

## 2025-03-19 ENCOUNTER — HOSPITAL ENCOUNTER (OUTPATIENT)
Dept: ULTRASOUND IMAGING | Facility: HOSPITAL | Age: 65
Discharge: HOME | End: 2025-03-19
Attending: INTERNAL MEDICINE
Payer: COMMERCIAL

## 2025-03-19 ENCOUNTER — HOSPITAL ENCOUNTER (OUTPATIENT)
Dept: BONE DENSITY | Facility: HOSPITAL | Age: 65
Discharge: HOME | End: 2025-03-19
Attending: INTERNAL MEDICINE
Payer: COMMERCIAL

## 2025-03-19 DIAGNOSIS — B18.1 CHRONIC VIRAL HEPATITIS B WITHOUT DELTA AGENT AND WITHOUT COMA (H): ICD-10-CM

## 2025-03-19 DIAGNOSIS — Z12.31 ENCOUNTER FOR SCREENING MAMMOGRAM FOR MALIGNANT NEOPLASM OF BREAST: ICD-10-CM

## 2025-03-19 DIAGNOSIS — Z78.0 POST-MENOPAUSAL: ICD-10-CM

## 2025-03-19 PROCEDURE — 77063 BREAST TOMOSYNTHESIS BI: CPT

## 2025-03-19 PROCEDURE — 77080 DXA BONE DENSITY AXIAL: CPT

## 2025-03-19 PROCEDURE — 77091 TBS TECHL CALCULATION ONLY: CPT

## 2025-03-19 PROCEDURE — 77067 SCR MAMMO BI INCL CAD: CPT

## 2025-03-19 PROCEDURE — 76705 ECHO EXAM OF ABDOMEN: CPT

## 2025-05-08 DIAGNOSIS — Z11.59 ENCOUNTER FOR SCREENING FOR OTHER VIRAL DISEASES: ICD-10-CM

## 2025-05-08 DIAGNOSIS — B18.1 VIRAL HEPATITIS B CHRONIC (H): Primary | ICD-10-CM

## 2025-05-23 ENCOUNTER — PRE VISIT (OUTPATIENT)
Dept: GASTROENTEROLOGY | Facility: CLINIC | Age: 65
End: 2025-05-23

## (undated) DEVICE — TUBING IRRIGATOR STRAIGHTSHOT XPS 1895522

## (undated) DEVICE — TRACKER ENT OTS INSTRUMENT FUSION 9733533

## (undated) DEVICE — NDL 25GA 2"  8881200441

## (undated) DEVICE — ESU GROUND PAD ADULT W/CORD E7507

## (undated) DEVICE — SYR 03ML LL W/O NDL 309657

## (undated) DEVICE — ENDO SHEATH STORZ SHARPSITE ENDOSCRUB 0DEG 4MM 1912000

## (undated) DEVICE — BLADE QUADCUT ROTATABLE FUSION 3.4MMX13CM M4 1883480EM

## (undated) DEVICE — WIPE INSTRUMENT MEROCEL 400200

## (undated) DEVICE — TUBING SUCTION 10'X3/16" N510

## (undated) DEVICE — GLOVE PROTEXIS MICRO 7.5  2D73PM75

## (undated) DEVICE — DRAPE WARMER 66X44" ORS-300

## (undated) DEVICE — SOL NACL 0.9% INJ 1000ML BAG 2B1324X

## (undated) DEVICE — SPONGE COTTONOID 2X1/2" 80-1406

## (undated) DEVICE — SUCTION MANIFOLD NEPTUNE 2 SYS 1 PORT 702-025-000

## (undated) DEVICE — SOL NACL 0.9% IRRIG 500ML BOTTLE 2F7123

## (undated) DEVICE — BLADE SHAVER SINUS 4MM RAD 12DEG CVD 1884012HR

## (undated) DEVICE — BLADE SHAVER SINUS 4MM RAD 60DEG CVD 1884016HR

## (undated) DEVICE — TRACKER PATIENT NON-INVASIVE AXIEM 9734887XOM

## (undated) DEVICE — LINEN TOWEL PACK X5 5464

## (undated) DEVICE — SPECIMEN TRAP MUCOUS 40ML LUKI C30200A

## (undated) DEVICE — SYR 30ML LL W/O NDL 302832

## (undated) DEVICE — DRSG HOLDER NASAL DALE 600

## (undated) DEVICE — SPECIMEN TRAP STRYKER NEPTUNE IN-LINE 0700-050-000

## (undated) DEVICE — EYE STRIP FLUORESCEIN TEST 1MG BIO-GLO HUO900-11

## (undated) DEVICE — PACK ENT ENDOSCOPY CUSTOM ASC

## (undated) DEVICE — LIGHT HANDLE X1 31140133

## (undated) RX ORDER — FENTANYL CITRATE 50 UG/ML
INJECTION, SOLUTION INTRAMUSCULAR; INTRAVENOUS
Status: DISPENSED
Start: 2023-06-29

## (undated) RX ORDER — ACETAMINOPHEN 325 MG/1
TABLET ORAL
Status: DISPENSED
Start: 2023-06-29

## (undated) RX ORDER — CEFAZOLIN SODIUM 2 G/50ML
SOLUTION INTRAVENOUS
Status: DISPENSED
Start: 2023-06-29

## (undated) RX ORDER — EPINEPHRINE NASAL SOLUTION 1 MG/ML
SOLUTION NASAL
Status: DISPENSED
Start: 2023-06-29